# Patient Record
Sex: FEMALE | Race: WHITE | NOT HISPANIC OR LATINO
[De-identification: names, ages, dates, MRNs, and addresses within clinical notes are randomized per-mention and may not be internally consistent; named-entity substitution may affect disease eponyms.]

---

## 2017-11-07 ENCOUNTER — APPOINTMENT (OUTPATIENT)
Dept: OBGYN | Facility: CLINIC | Age: 62
End: 2017-11-07
Payer: COMMERCIAL

## 2017-11-07 VITALS
SYSTOLIC BLOOD PRESSURE: 120 MMHG | BODY MASS INDEX: 26.87 KG/M2 | WEIGHT: 146 LBS | HEIGHT: 62 IN | DIASTOLIC BLOOD PRESSURE: 82 MMHG

## 2017-11-07 PROCEDURE — 99396 PREV VISIT EST AGE 40-64: CPT

## 2017-11-08 ENCOUNTER — MESSAGE (OUTPATIENT)
Age: 62
End: 2017-11-08

## 2017-11-08 LAB — HPV HIGH+LOW RISK DNA PNL CVX: NOT DETECTED

## 2017-11-11 LAB — CYTOLOGY CVX/VAG DOC THIN PREP: NORMAL

## 2019-04-09 ENCOUNTER — APPOINTMENT (OUTPATIENT)
Dept: OBGYN | Facility: CLINIC | Age: 64
End: 2019-04-09
Payer: COMMERCIAL

## 2019-04-09 VITALS
WEIGHT: 145 LBS | SYSTOLIC BLOOD PRESSURE: 127 MMHG | HEIGHT: 62 IN | DIASTOLIC BLOOD PRESSURE: 82 MMHG | BODY MASS INDEX: 26.68 KG/M2

## 2019-04-09 PROCEDURE — 99396 PREV VISIT EST AGE 40-64: CPT

## 2019-04-09 PROCEDURE — 82270 OCCULT BLOOD FECES: CPT

## 2019-04-09 PROCEDURE — 81003 URINALYSIS AUTO W/O SCOPE: CPT | Mod: NC,QW

## 2019-04-09 NOTE — PHYSICAL EXAM
[Awake] : awake [Alert] : alert [Acute Distress] : no acute distress [Thyroid Nodule] : no thyroid nodule [Goiter] : no goiter [Mass] : no breast mass [Nipple Discharge] : no nipple discharge [Axillary LAD] : no axillary lymphadenopathy [Soft] : soft [Tender] : non tender [Oriented x3] : oriented to person, place, and time [Normal] : uterus [No Bleeding] : there was no active vaginal bleeding [Uterine Adnexae] : were not tender and not enlarged [No Tenderness] : no rectal tenderness [Occult Blood] : occult blood test from digital rectal exam was negative [External Hemorrhoid] : an external hemorrhoid [Nl Sphincter Tone] : normal sphincter tone [RRR, No Murmurs] : RRR, no murmurs [CTAB] : CTAB

## 2019-04-10 ENCOUNTER — MESSAGE (OUTPATIENT)
Age: 64
End: 2019-04-10

## 2019-04-10 LAB
APPEARANCE: CLEAR
BACTERIA: NEGATIVE
BILIRUB UR QL STRIP: NORMAL
BILIRUBIN URINE: NEGATIVE
BLOOD URINE: NEGATIVE
COLOR: COLORLESS
GLUCOSE QUALITATIVE U: NEGATIVE
GLUCOSE UR-MCNC: NORMAL
HCG UR QL: 0.2 EU/DL
HGB UR QL STRIP.AUTO: NORMAL
HYALINE CASTS: 2 /LPF
KETONES UR-MCNC: NORMAL
KETONES URINE: NEGATIVE
LEUKOCYTE ESTERASE UR QL STRIP: NORMAL
LEUKOCYTE ESTERASE URINE: NEGATIVE
MICROSCOPIC-UA: NORMAL
NITRITE UR QL STRIP: NORMAL
NITRITE URINE: NEGATIVE
PH UR STRIP: 6
PH URINE: 6.5
PROT UR STRIP-MCNC: NORMAL
PROTEIN URINE: NEGATIVE
RED BLOOD CELLS URINE: 2 /HPF
SP GR UR STRIP: 1.01
SPECIFIC GRAVITY URINE: 1.01
SQUAMOUS EPITHELIAL CELLS: 1 /HPF
UROBILINOGEN URINE: NORMAL
WHITE BLOOD CELLS URINE: 1 /HPF

## 2019-04-11 ENCOUNTER — MESSAGE (OUTPATIENT)
Age: 64
End: 2019-04-11

## 2019-04-11 LAB
BACTERIA UR CULT: NORMAL
HPV HIGH+LOW RISK DNA PNL CVX: NOT DETECTED

## 2019-04-13 ENCOUNTER — MESSAGE (OUTPATIENT)
Age: 64
End: 2019-04-13

## 2019-04-13 LAB — CYTOLOGY CVX/VAG DOC THIN PREP: NORMAL

## 2019-05-03 ENCOUNTER — RESULT REVIEW (OUTPATIENT)
Age: 64
End: 2019-05-03

## 2020-06-17 ENCOUNTER — APPOINTMENT (OUTPATIENT)
Dept: OBGYN | Facility: CLINIC | Age: 65
End: 2020-06-17
Payer: COMMERCIAL

## 2020-06-17 VITALS
HEIGHT: 62 IN | BODY MASS INDEX: 27.79 KG/M2 | WEIGHT: 151 LBS | SYSTOLIC BLOOD PRESSURE: 119 MMHG | DIASTOLIC BLOOD PRESSURE: 82 MMHG

## 2020-06-17 DIAGNOSIS — Z12.12 ENCOUNTER FOR SCREENING FOR MALIGNANT NEOPLASM OF COLON: ICD-10-CM

## 2020-06-17 DIAGNOSIS — Z01.419 ENCOUNTER FOR GYNECOLOGICAL EXAMINATION (GENERAL) (ROUTINE) W/OUT ABNORMAL FINDINGS: ICD-10-CM

## 2020-06-17 DIAGNOSIS — Z12.11 ENCOUNTER FOR SCREENING FOR MALIGNANT NEOPLASM OF COLON: ICD-10-CM

## 2020-06-17 PROCEDURE — 99396 PREV VISIT EST AGE 40-64: CPT

## 2020-06-17 PROCEDURE — 82270 OCCULT BLOOD FECES: CPT

## 2020-06-17 NOTE — PHYSICAL EXAM
[Acute Distress] : no acute distress [Awake] : awake [Alert] : alert [Nipple Discharge] : no nipple discharge [Mass] : no breast mass [Axillary LAD] : no axillary lymphadenopathy [Soft] : soft [Oriented x3] : oriented to person, place, and time [Tender] : non tender [Normal] : uterus [Uterine Adnexae] : were not tender and not enlarged [No Bleeding] : there was no active vaginal bleeding [No Tenderness] : no rectal tenderness [Occult Blood] : occult blood test from digital rectal exam was negative [Nl Sphincter Tone] : normal sphincter tone [External Hemorrhoid] : an external hemorrhoid

## 2020-06-18 ENCOUNTER — MESSAGE (OUTPATIENT)
Age: 65
End: 2020-06-18

## 2020-06-18 ENCOUNTER — NON-APPOINTMENT (OUTPATIENT)
Age: 65
End: 2020-06-18

## 2020-06-18 LAB
APPEARANCE: CLEAR
BACTERIA: NEGATIVE
BILIRUBIN URINE: NEGATIVE
BLOOD URINE: NORMAL
COLOR: YELLOW
GLUCOSE QUALITATIVE U: NEGATIVE
HYALINE CASTS: 2 /LPF
KETONES URINE: NEGATIVE
LEUKOCYTE ESTERASE URINE: ABNORMAL
MICROSCOPIC-UA: NORMAL
NITRITE URINE: NEGATIVE
PH URINE: 6
PROTEIN URINE: NORMAL
RED BLOOD CELLS URINE: 6 /HPF
SPECIFIC GRAVITY URINE: 1.03
SQUAMOUS EPITHELIAL CELLS: 3 /HPF
UROBILINOGEN URINE: NORMAL
WHITE BLOOD CELLS URINE: 3 /HPF

## 2020-06-19 ENCOUNTER — MESSAGE (OUTPATIENT)
Age: 65
End: 2020-06-19

## 2020-06-19 LAB
CYTOLOGY CVX/VAG DOC THIN PREP: NORMAL
HPV HIGH+LOW RISK DNA PNL CVX: NOT DETECTED

## 2020-06-21 LAB — BACTERIA UR CULT: ABNORMAL

## 2020-06-25 ENCOUNTER — APPOINTMENT (OUTPATIENT)
Dept: OBGYN | Facility: CLINIC | Age: 65
End: 2020-06-25

## 2022-04-18 ENCOUNTER — NON-APPOINTMENT (OUTPATIENT)
Age: 67
End: 2022-04-18

## 2022-04-18 ENCOUNTER — APPOINTMENT (OUTPATIENT)
Dept: OPHTHALMOLOGY | Facility: CLINIC | Age: 67
End: 2022-04-18
Payer: COMMERCIAL

## 2022-04-18 PROCEDURE — 92004 COMPRE OPH EXAM NEW PT 1/>: CPT

## 2022-06-09 ENCOUNTER — APPOINTMENT (OUTPATIENT)
Dept: OPHTHALMOLOGY | Facility: CLINIC | Age: 67
End: 2022-06-09
Payer: COMMERCIAL

## 2022-06-09 ENCOUNTER — NON-APPOINTMENT (OUTPATIENT)
Age: 67
End: 2022-06-09

## 2022-06-09 PROCEDURE — 92014 COMPRE OPH EXAM EST PT 1/>: CPT

## 2022-06-09 PROCEDURE — 92136 OPHTHALMIC BIOMETRY: CPT

## 2022-06-29 ENCOUNTER — APPOINTMENT (OUTPATIENT)
Dept: OPHTHALMOLOGY | Facility: HOSPITAL | Age: 67
End: 2022-06-29

## 2022-06-30 ENCOUNTER — APPOINTMENT (OUTPATIENT)
Dept: OPHTHALMOLOGY | Facility: CLINIC | Age: 67
End: 2022-06-30

## 2022-07-07 ENCOUNTER — APPOINTMENT (OUTPATIENT)
Dept: OPHTHALMOLOGY | Facility: CLINIC | Age: 67
End: 2022-07-07

## 2022-07-19 ENCOUNTER — OFFICE (OUTPATIENT)
Dept: URBAN - METROPOLITAN AREA CLINIC 12 | Facility: CLINIC | Age: 67
Setting detail: OPHTHALMOLOGY
End: 2022-07-19
Payer: COMMERCIAL

## 2022-07-19 DIAGNOSIS — H35.363: ICD-10-CM

## 2022-07-19 DIAGNOSIS — H43.813: ICD-10-CM

## 2022-07-19 DIAGNOSIS — H25.13: ICD-10-CM

## 2022-07-19 PROCEDURE — 92134 CPTRZ OPH DX IMG PST SGM RTA: CPT | Performed by: OPHTHALMOLOGY

## 2022-07-19 PROCEDURE — 92004 COMPRE OPH EXAM NEW PT 1/>: CPT | Performed by: OPHTHALMOLOGY

## 2022-07-19 ASSESSMENT — TONOMETRY
OS_IOP_MMHG: 21
OD_IOP_MMHG: 20

## 2022-07-19 ASSESSMENT — REFRACTION_CURRENTRX
OS_OVR_VA: 20/
OS_ADD: +2.50
OS_VPRISM_DIRECTION: SV
OD_ADD: +2.50
OD_VPRISM_DIRECTION: SV
OD_OVR_VA: 20/

## 2022-07-19 ASSESSMENT — SPHEQUIV_DERIVED
OS_SPHEQUIV: 0.625
OS_SPHEQUIV: 0.625

## 2022-07-19 ASSESSMENT — REFRACTION_AUTOREFRACTION
OD_AXIS: 000
OS_SPHERE: +0.75
OD_CYLINDER: SPH
OD_SPHERE: +0.75
OS_CYLINDER: -0.25
OS_AXIS: 053

## 2022-07-19 ASSESSMENT — KERATOMETRY
OD_K2POWER_DIOPTERS: 45.25
OD_K1POWER_DIOPTERS: 43.75
OD_AXISANGLE_DEGREES: 088
OS_K1POWER_DIOPTERS: 44.00
OS_K2POWER_DIOPTERS: 45.00
OS_AXISANGLE_DEGREES: 094

## 2022-07-19 ASSESSMENT — REFRACTION_MANIFEST
OD_AXIS: 000
OD_CYLINDER: SPH
OD_VA1: 20/25
OS_VA1: 20/25
OS_CYLINDER: -0.25
OS_SPHERE: +0.75
OS_AXIS: 055
OD_SPHERE: +0.75

## 2022-07-19 ASSESSMENT — VISUAL ACUITY
OS_BCVA: 20/40
OD_BCVA: 20/30

## 2022-07-19 ASSESSMENT — CONFRONTATIONAL VISUAL FIELD TEST (CVF)
OD_FINDINGS: FULL
OS_FINDINGS: FULL

## 2022-07-19 ASSESSMENT — AXIALLENGTH_DERIVED
OS_AL: 22.9965
OS_AL: 22.9965

## 2022-08-30 ENCOUNTER — OFFICE (OUTPATIENT)
Dept: URBAN - METROPOLITAN AREA CLINIC 12 | Facility: CLINIC | Age: 67
Setting detail: OPHTHALMOLOGY
End: 2022-08-30
Payer: COMMERCIAL

## 2022-08-30 DIAGNOSIS — H25.11: ICD-10-CM

## 2022-08-30 DIAGNOSIS — H25.13: ICD-10-CM

## 2022-08-30 PROCEDURE — 99214 OFFICE O/P EST MOD 30 MIN: CPT | Performed by: OPHTHALMOLOGY

## 2022-08-30 PROCEDURE — 92136 OPHTHALMIC BIOMETRY: CPT | Performed by: OPHTHALMOLOGY

## 2022-08-30 ASSESSMENT — REFRACTION_AUTOREFRACTION
OD_AXIS: 014
OS_CYLINDER: -0.50
OS_SPHERE: +1.00
OS_AXIS: 048
OD_CYLINDER: -0.25
OD_SPHERE: +0.75

## 2022-08-30 ASSESSMENT — REFRACTION_MANIFEST
OD_VA1: 20/25
OD_AXIS: 000
OD_CYLINDER: SPH
OS_CYLINDER: -0.25
OS_SPHERE: +0.75
OS_VA1: 20/25
OS_AXIS: 055
OD_SPHERE: +0.75

## 2022-08-30 ASSESSMENT — KERATOMETRY
OS_AXISANGLE_DEGREES: 105
OS_K1POWER_DIOPTERS: 44.00
OD_K1POWER_DIOPTERS: 43.75
OD_K2POWER_DIOPTERS: 45.25
OD_AXISANGLE_DEGREES: 089
OS_K2POWER_DIOPTERS: 45.00

## 2022-08-30 ASSESSMENT — SUPERFICIAL PUNCTATE KERATITIS (SPK)
OS_SPK: T
OD_SPK: T

## 2022-08-30 ASSESSMENT — SPHEQUIV_DERIVED
OS_SPHEQUIV: 0.75
OS_SPHEQUIV: 0.625
OD_SPHEQUIV: 0.625

## 2022-08-30 ASSESSMENT — AXIALLENGTH_DERIVED
OD_AL: 22.9965
OS_AL: 22.9965
OS_AL: 22.9503

## 2022-08-30 ASSESSMENT — CONFRONTATIONAL VISUAL FIELD TEST (CVF)
OS_FINDINGS: FULL
OD_FINDINGS: FULL

## 2022-08-30 ASSESSMENT — REFRACTION_CURRENTRX
OD_ADD: +2.50
OD_OVR_VA: 20/
OS_VPRISM_DIRECTION: SV
OD_VPRISM_DIRECTION: SV
OS_OVR_VA: 20/
OS_ADD: +2.50

## 2022-08-30 ASSESSMENT — TONOMETRY
OD_IOP_MMHG: 21
OS_IOP_MMHG: 21

## 2022-08-30 ASSESSMENT — VISUAL ACUITY
OD_BCVA: 20/40
OS_BCVA: 20/50

## 2022-09-09 ENCOUNTER — OFFICE (OUTPATIENT)
Dept: URBAN - METROPOLITAN AREA CLINIC 12 | Facility: CLINIC | Age: 67
Setting detail: OPHTHALMOLOGY
End: 2022-09-09
Payer: COMMERCIAL

## 2022-09-09 DIAGNOSIS — Z20.822: ICD-10-CM

## 2022-09-09 DIAGNOSIS — Z01.812: ICD-10-CM

## 2022-09-09 PROCEDURE — 99211 OFF/OP EST MAY X REQ PHY/QHP: CPT | Performed by: OPHTHALMOLOGY

## 2022-09-09 ASSESSMENT — REFRACTION_CURRENTRX
OS_VPRISM_DIRECTION: SV
OS_ADD: +2.50
OD_ADD: +2.50
OD_VPRISM_DIRECTION: SV
OD_OVR_VA: 20/
OS_OVR_VA: 20/

## 2022-09-09 ASSESSMENT — REFRACTION_MANIFEST
OS_SPHERE: +0.75
OS_VA1: 20/25
OD_SPHERE: +0.75
OD_CYLINDER: SPH
OD_VA1: 20/25
OS_AXIS: 055
OD_AXIS: 000
OS_CYLINDER: -0.25

## 2022-09-09 ASSESSMENT — KERATOMETRY
OS_K1POWER_DIOPTERS: 44.00
OS_AXISANGLE_DEGREES: 105
OD_K2POWER_DIOPTERS: 45.25
OD_AXISANGLE_DEGREES: 089
OD_K1POWER_DIOPTERS: 43.75
OS_K2POWER_DIOPTERS: 45.00

## 2022-09-09 ASSESSMENT — VISUAL ACUITY
OD_BCVA: 20/40
OS_BCVA: 20/50

## 2022-09-09 ASSESSMENT — SPHEQUIV_DERIVED
OS_SPHEQUIV: 0.75
OD_SPHEQUIV: 0.625
OS_SPHEQUIV: 0.625

## 2022-09-09 ASSESSMENT — REFRACTION_AUTOREFRACTION
OS_AXIS: 048
OD_AXIS: 014
OS_SPHERE: +1.00
OD_SPHERE: +0.75
OD_CYLINDER: -0.25
OS_CYLINDER: -0.50

## 2022-09-09 ASSESSMENT — AXIALLENGTH_DERIVED
OD_AL: 22.9965
OS_AL: 22.9965
OS_AL: 22.9503

## 2022-09-13 ENCOUNTER — AMBULATORY SURGERY CENTER (OUTPATIENT)
Dept: URBAN - METROPOLITAN AREA SURGERY 27 | Facility: SURGERY | Age: 67
Setting detail: OPHTHALMOLOGY
End: 2022-09-13
Payer: COMMERCIAL

## 2022-09-13 DIAGNOSIS — H25.11: ICD-10-CM

## 2022-09-13 PROCEDURE — 66984 XCAPSL CTRC RMVL W/O ECP: CPT | Performed by: OPHTHALMOLOGY

## 2022-09-14 ENCOUNTER — RX ONLY (RX ONLY)
Age: 67
End: 2022-09-14

## 2022-09-14 ENCOUNTER — OFFICE (OUTPATIENT)
Dept: URBAN - METROPOLITAN AREA CLINIC 12 | Facility: CLINIC | Age: 67
Setting detail: OPHTHALMOLOGY
End: 2022-09-14
Payer: COMMERCIAL

## 2022-09-14 DIAGNOSIS — Z96.1: ICD-10-CM

## 2022-09-14 PROCEDURE — 99024 POSTOP FOLLOW-UP VISIT: CPT | Performed by: OPTOMETRIST

## 2022-09-14 ASSESSMENT — SUPERFICIAL PUNCTATE KERATITIS (SPK)
OD_SPK: T
OS_SPK: T

## 2022-09-14 ASSESSMENT — REFRACTION_AUTOREFRACTION
OD_SPHERE: +1.00
OD_CYLINDER: -1.50
OD_AXIS: 176
OS_AXIS: 054
OS_SPHERE: +1.00
OS_CYLINDER: -0.25

## 2022-09-14 ASSESSMENT — REFRACTION_CURRENTRX
OS_VPRISM_DIRECTION: SV
OD_SPHERE: +2.50
OS_SPHERE: +2.50
OD_VPRISM_DIRECTION: SV
OD_OVR_VA: 20/
OS_OVR_VA: 20/

## 2022-09-14 ASSESSMENT — KERATOMETRY
OD_K2POWER_DIOPTERS: 45.00
OD_K1POWER_DIOPTERS: 43.25
OD_AXISANGLE_DEGREES: 090
OS_AXISANGLE_DEGREES: 103
OS_K2POWER_DIOPTERS: 44.75
OS_K1POWER_DIOPTERS: 43.75

## 2022-09-14 ASSESSMENT — CORNEAL EDEMA CLINICAL DESCRIPTION: OD_CORNEALEDEMA: T

## 2022-09-14 ASSESSMENT — REFRACTION_MANIFEST
OD_VA1: 20/25
OD_SPHERE: +0.75
OS_CYLINDER: -0.25
OD_CYLINDER: SPH
OD_AXIS: 000
OS_AXIS: 055
OS_VA1: 20/25
OS_SPHERE: +0.75

## 2022-09-14 ASSESSMENT — TONOMETRY
OS_IOP_MMHG: 16
OD_IOP_MMHG: 18

## 2022-09-14 ASSESSMENT — SPHEQUIV_DERIVED
OS_SPHEQUIV: 0.875
OS_SPHEQUIV: 0.625
OD_SPHEQUIV: 0.25

## 2022-09-14 ASSESSMENT — VISUAL ACUITY
OS_BCVA: 20/70
OD_BCVA: 20/40-2

## 2022-09-14 ASSESSMENT — AXIALLENGTH_DERIVED
OS_AL: 23.0841
OS_AL: 22.9912
OD_AL: 23.2694

## 2022-09-14 ASSESSMENT — CONFRONTATIONAL VISUAL FIELD TEST (CVF)
OD_FINDINGS: FULL
OS_FINDINGS: FULL

## 2022-09-20 ENCOUNTER — OFFICE (OUTPATIENT)
Dept: URBAN - METROPOLITAN AREA CLINIC 12 | Facility: CLINIC | Age: 67
Setting detail: OPHTHALMOLOGY
End: 2022-09-20
Payer: COMMERCIAL

## 2022-09-20 DIAGNOSIS — H25.12: ICD-10-CM

## 2022-09-20 PROCEDURE — 92136 OPHTHALMIC BIOMETRY: CPT | Performed by: OPHTHALMOLOGY

## 2022-09-20 ASSESSMENT — REFRACTION_MANIFEST
OS_VA1: 20/25
OS_SPHERE: +0.75
OS_AXIS: 055
OD_VA1: 20/25
OS_CYLINDER: -0.25
OD_AXIS: 000
OD_SPHERE: +0.75
OD_CYLINDER: SPH

## 2022-09-20 ASSESSMENT — SPHEQUIV_DERIVED
OS_SPHEQUIV: 0.625
OD_SPHEQUIV: 0
OS_SPHEQUIV: 0.875

## 2022-09-20 ASSESSMENT — CONFRONTATIONAL VISUAL FIELD TEST (CVF)
OS_FINDINGS: FULL
OD_FINDINGS: FULL

## 2022-09-20 ASSESSMENT — KERATOMETRY
OS_K1POWER_DIOPTERS: 44.00
OD_AXISANGLE_DEGREES: 099
OS_K2POWER_DIOPTERS: 45.00
OD_K2POWER_DIOPTERS: 45.00
OD_K1POWER_DIOPTERS: 43.50
OS_AXISANGLE_DEGREES: 098

## 2022-09-20 ASSESSMENT — REFRACTION_CURRENTRX
OD_VPRISM_DIRECTION: SV
OS_ADD: +2.50
OD_OVR_VA: 20/
OD_ADD: +2.50
OS_VPRISM_DIRECTION: SV
OS_OVR_VA: 20/

## 2022-09-20 ASSESSMENT — VISUAL ACUITY
OD_BCVA: 20/40+2
OS_BCVA: 20/40+2

## 2022-09-20 ASSESSMENT — REFRACTION_AUTOREFRACTION
OD_CYLINDER: -1.00
OD_AXIS: 176
OS_AXIS: 054
OS_CYLINDER: -0.25
OS_SPHERE: +1.00
OD_SPHERE: +0.50

## 2022-09-20 ASSESSMENT — AXIALLENGTH_DERIVED
OD_AL: 23.3196
OS_AL: 22.9965
OS_AL: 22.9043

## 2022-09-20 ASSESSMENT — TONOMETRY: OD_IOP_MMHG: 21

## 2022-09-20 ASSESSMENT — CORNEAL EDEMA CLINICAL DESCRIPTION: OD_CORNEALEDEMA: T

## 2022-09-20 ASSESSMENT — SUPERFICIAL PUNCTATE KERATITIS (SPK)
OS_SPK: T
OD_SPK: T

## 2022-09-23 ENCOUNTER — APPOINTMENT (OUTPATIENT)
Dept: ORTHOPEDIC SURGERY | Facility: CLINIC | Age: 67
End: 2022-09-23

## 2022-09-23 ENCOUNTER — OFFICE (OUTPATIENT)
Dept: URBAN - METROPOLITAN AREA CLINIC 12 | Facility: CLINIC | Age: 67
Setting detail: OPHTHALMOLOGY
End: 2022-09-23
Payer: COMMERCIAL

## 2022-09-23 DIAGNOSIS — Z01.812: ICD-10-CM

## 2022-09-23 DIAGNOSIS — Z20.822: ICD-10-CM

## 2022-09-23 PROCEDURE — 99211 OFF/OP EST MAY X REQ PHY/QHP: CPT | Performed by: OPHTHALMOLOGY

## 2022-09-23 ASSESSMENT — REFRACTION_AUTOREFRACTION
OD_AXIS: 176
OS_SPHERE: +1.00
OD_SPHERE: +0.50
OS_AXIS: 054
OD_CYLINDER: -1.00
OS_CYLINDER: -0.25

## 2022-09-23 ASSESSMENT — REFRACTION_MANIFEST
OD_VA1: 20/25
OS_SPHERE: +0.75
OD_CYLINDER: SPH
OS_VA1: 20/25
OS_CYLINDER: -0.25
OD_SPHERE: +0.75
OS_AXIS: 055
OD_AXIS: 000

## 2022-09-23 ASSESSMENT — KERATOMETRY
OD_AXISANGLE_DEGREES: 099
OS_AXISANGLE_DEGREES: 098
OD_K1POWER_DIOPTERS: 43.50
OD_K2POWER_DIOPTERS: 45.00
OS_K1POWER_DIOPTERS: 44.00
OS_K2POWER_DIOPTERS: 45.00

## 2022-09-23 ASSESSMENT — REFRACTION_CURRENTRX
OS_OVR_VA: 20/
OS_VPRISM_DIRECTION: SV
OS_ADD: +2.50
OD_ADD: +2.50
OD_VPRISM_DIRECTION: SV
OD_OVR_VA: 20/

## 2022-09-23 ASSESSMENT — VISUAL ACUITY
OD_BCVA: 20/40+2
OS_BCVA: 20/40+2

## 2022-09-23 ASSESSMENT — SPHEQUIV_DERIVED
OS_SPHEQUIV: 0.875
OS_SPHEQUIV: 0.625
OD_SPHEQUIV: 0

## 2022-09-23 ASSESSMENT — AXIALLENGTH_DERIVED
OD_AL: 23.3196
OS_AL: 22.9965
OS_AL: 22.9043

## 2022-09-27 ENCOUNTER — AMBULATORY SURGERY CENTER (OUTPATIENT)
Dept: URBAN - METROPOLITAN AREA SURGERY 27 | Facility: SURGERY | Age: 67
Setting detail: OPHTHALMOLOGY
End: 2022-09-27
Payer: COMMERCIAL

## 2022-09-27 DIAGNOSIS — H25.12: ICD-10-CM

## 2022-09-27 PROCEDURE — 66984 XCAPSL CTRC RMVL W/O ECP: CPT | Performed by: OPHTHALMOLOGY

## 2022-09-28 ENCOUNTER — RX ONLY (RX ONLY)
Age: 67
End: 2022-09-28

## 2022-09-28 ENCOUNTER — OFFICE (OUTPATIENT)
Dept: URBAN - METROPOLITAN AREA CLINIC 12 | Facility: CLINIC | Age: 67
Setting detail: OPHTHALMOLOGY
End: 2022-09-28
Payer: COMMERCIAL

## 2022-09-28 DIAGNOSIS — Z96.1: ICD-10-CM

## 2022-09-28 PROCEDURE — 99024 POSTOP FOLLOW-UP VISIT: CPT | Performed by: OPTOMETRIST

## 2022-09-28 ASSESSMENT — SPHEQUIV_DERIVED
OS_SPHEQUIV: 0.625
OD_SPHEQUIV: 0.25
OS_SPHEQUIV: 0.375

## 2022-09-28 ASSESSMENT — REFRACTION_CURRENTRX
OD_ADD: +2.50
OD_VPRISM_DIRECTION: SV
OD_OVR_VA: 20/
OS_ADD: +2.50
OS_OVR_VA: 20/
OS_VPRISM_DIRECTION: SV

## 2022-09-28 ASSESSMENT — KERATOMETRY
OD_K2POWER_DIOPTERS: 45.00
OD_AXISANGLE_DEGREES: 089
OS_K2POWER_DIOPTERS: 44.50
OD_K1POWER_DIOPTERS: 43.50
OS_AXISANGLE_DEGREES: 079
METHOD_AUTO_MANUAL: AUTO
OS_K1POWER_DIOPTERS: 43.50

## 2022-09-28 ASSESSMENT — REFRACTION_MANIFEST
OS_CYLINDER: -0.25
OS_SPHERE: +0.75
OS_VA1: 20/25
OD_SPHERE: +0.75
OD_VA1: 20/25
OD_AXIS: 000
OD_CYLINDER: SPH
OS_AXIS: 055

## 2022-09-28 ASSESSMENT — VISUAL ACUITY
OS_BCVA: 20/40
OD_BCVA: 20/40-2

## 2022-09-28 ASSESSMENT — TONOMETRY: OD_IOP_MMHG: 18

## 2022-09-28 ASSESSMENT — AXIALLENGTH_DERIVED
OS_AL: 23.1723
OS_AL: 23.2667
OD_AL: 23.2248

## 2022-09-28 ASSESSMENT — SUPERFICIAL PUNCTATE KERATITIS (SPK)
OD_SPK: T
OS_SPK: T

## 2022-09-28 ASSESSMENT — REFRACTION_AUTOREFRACTION
OS_AXIS: 178
OD_SPHERE: +0.75
OS_CYLINDER: -1.25
OD_AXIS: 176
OD_CYLINDER: -1.00
OS_SPHERE: +1.00

## 2022-09-28 ASSESSMENT — CONFRONTATIONAL VISUAL FIELD TEST (CVF)
OD_FINDINGS: FULL
OS_FINDINGS: FULL

## 2022-09-28 ASSESSMENT — CORNEAL EDEMA CLINICAL DESCRIPTION: OD_CORNEALEDEMA: ABSENT

## 2022-10-04 ENCOUNTER — OFFICE (OUTPATIENT)
Dept: URBAN - METROPOLITAN AREA CLINIC 12 | Facility: CLINIC | Age: 67
Setting detail: OPHTHALMOLOGY
End: 2022-10-04
Payer: COMMERCIAL

## 2022-10-04 DIAGNOSIS — H04.123: ICD-10-CM

## 2022-10-04 DIAGNOSIS — Z96.1: ICD-10-CM

## 2022-10-04 PROCEDURE — 99024 POSTOP FOLLOW-UP VISIT: CPT | Performed by: OPTOMETRIST

## 2022-10-04 ASSESSMENT — SPHEQUIV_DERIVED
OS_SPHEQUIV: 0.375
OD_SPHEQUIV: 0.25
OS_SPHEQUIV: 0.375
OD_SPHEQUIV: 0.25

## 2022-10-04 ASSESSMENT — KERATOMETRY
METHOD_AUTO_MANUAL: AUTO
OS_K2POWER_DIOPTERS: 44.50
OS_K1POWER_DIOPTERS: 43.75
OS_AXISANGLE_DEGREES: 087
OD_AXISANGLE_DEGREES: 098
OD_K1POWER_DIOPTERS: 43.50
OD_K2POWER_DIOPTERS: 45.25

## 2022-10-04 ASSESSMENT — REFRACTION_AUTOREFRACTION
OD_CYLINDER: -1.00
OS_CYLINDER: -0.75
OD_SPHERE: +0.75
OS_SPHERE: +0.75
OD_AXIS: 172
OS_AXIS: 019

## 2022-10-04 ASSESSMENT — TONOMETRY
OD_IOP_MMHG: 19
OS_IOP_MMHG: 19

## 2022-10-04 ASSESSMENT — REFRACTION_CURRENTRX
OS_ADD: +2.50
OD_VPRISM_DIRECTION: SV
OD_OVR_VA: 20/
OD_ADD: +2.50
OS_OVR_VA: 20/
OS_VPRISM_DIRECTION: SV

## 2022-10-04 ASSESSMENT — AXIALLENGTH_DERIVED
OS_AL: 23.2221
OD_AL: 23.1804
OD_AL: 23.1804
OS_AL: 23.2221

## 2022-10-04 ASSESSMENT — VISUAL ACUITY
OD_BCVA: 20/30+3
OS_BCVA: 20/50+2

## 2022-10-04 ASSESSMENT — REFRACTION_MANIFEST
OD_CYLINDER: -1.00
OD_AXIS: 172
OS_AXIS: 019
OD_SPHERE: +0.75
OS_CYLINDER: -0.75
OS_VA1: 20/25
OS_SPHERE: +0.75
OD_VA1: 20/25

## 2022-10-04 ASSESSMENT — TEAR BREAK UP TIME (TBUT)
OS_TBUT: T
OD_TBUT: T

## 2022-10-04 ASSESSMENT — CONFRONTATIONAL VISUAL FIELD TEST (CVF)
OD_FINDINGS: FULL
OS_FINDINGS: FULL

## 2022-10-04 ASSESSMENT — SUPERFICIAL PUNCTATE KERATITIS (SPK)
OS_SPK: 1+
OD_SPK: 1+

## 2022-11-29 ENCOUNTER — RX ONLY (RX ONLY)
Age: 67
End: 2022-11-29

## 2022-11-29 ENCOUNTER — OFFICE (OUTPATIENT)
Dept: URBAN - METROPOLITAN AREA CLINIC 12 | Facility: CLINIC | Age: 67
Setting detail: OPHTHALMOLOGY
End: 2022-11-29
Payer: COMMERCIAL

## 2022-11-29 DIAGNOSIS — Z96.1: ICD-10-CM

## 2022-11-29 PROBLEM — H35.363 DRUSEN; BOTH EYES: Status: ACTIVE | Noted: 2022-07-19

## 2022-11-29 PROBLEM — H52.7 REFRACTIVE ERROR: Status: ACTIVE | Noted: 2022-11-29

## 2022-11-29 PROBLEM — H43.813 POSTERIOR VITREOUS DETACHMENT; BOTH EYES: Status: ACTIVE | Noted: 2022-07-19

## 2022-11-29 PROBLEM — H04.123 DES- DRY EYES; BOTH EYES: Status: ACTIVE | Noted: 2022-08-30

## 2022-11-29 PROCEDURE — 99024 POSTOP FOLLOW-UP VISIT: CPT | Performed by: OPHTHALMOLOGY

## 2022-11-29 ASSESSMENT — KERATOMETRY
METHOD_AUTO_MANUAL: AUTO
OD_K1POWER_DIOPTERS: 43.75
OD_AXISANGLE_DEGREES: 093
OD_K2POWER_DIOPTERS: 45.25
OS_AXISANGLE_DEGREES: 095
OS_K1POWER_DIOPTERS: 44.25
OS_K2POWER_DIOPTERS: 45.25

## 2022-11-29 ASSESSMENT — REFRACTION_MANIFEST
OD_CYLINDER: -1.00
OS_AXIS: 019
OD_SPHERE: +0.75
OS_SPHERE: +0.75
OD_VA1: 20/25
OS_VA1: 20/25
OS_CYLINDER: -0.75
OD_AXIS: 172

## 2022-11-29 ASSESSMENT — VISUAL ACUITY
OD_BCVA: 20/30+
OS_BCVA: 20/40

## 2022-11-29 ASSESSMENT — REFRACTION_CURRENTRX
OD_ADD: +2.50
OS_VPRISM_DIRECTION: SV
OD_OVR_VA: 20/
OS_OVR_VA: 20/
OS_ADD: +2.50
OD_VPRISM_DIRECTION: SV

## 2022-11-29 ASSESSMENT — AXIALLENGTH_DERIVED
OD_AL: 23.1362
OD_AL: 23.325
OS_AL: 23.2356
OS_AL: 23.0018

## 2022-11-29 ASSESSMENT — TEAR BREAK UP TIME (TBUT)
OD_TBUT: T
OS_TBUT: T

## 2022-11-29 ASSESSMENT — TONOMETRY
OD_IOP_MMHG: 18
OS_IOP_MMHG: 20

## 2022-11-29 ASSESSMENT — SPHEQUIV_DERIVED
OD_SPHEQUIV: 0.25
OS_SPHEQUIV: 0.375
OD_SPHEQUIV: -0.25
OS_SPHEQUIV: -0.25

## 2022-11-29 ASSESSMENT — REFRACTION_AUTOREFRACTION
OS_AXIS: 016
OD_CYLINDER: -1.00
OS_SPHERE: +0.25
OS_CYLINDER: -1.00
OD_AXIS: 174
OD_SPHERE: +0.25

## 2022-11-29 ASSESSMENT — SUPERFICIAL PUNCTATE KERATITIS (SPK)
OD_SPK: 1+ 2+
OS_SPK: 1+ 2+

## 2022-11-29 ASSESSMENT — CONFRONTATIONAL VISUAL FIELD TEST (CVF)
OD_FINDINGS: FULL
OS_FINDINGS: FULL

## 2022-12-05 ENCOUNTER — APPOINTMENT (OUTPATIENT)
Dept: PAIN MANAGEMENT | Facility: CLINIC | Age: 67
End: 2022-12-05

## 2022-12-19 ENCOUNTER — APPOINTMENT (OUTPATIENT)
Dept: PAIN MANAGEMENT | Facility: CLINIC | Age: 67
End: 2022-12-19

## 2022-12-19 DIAGNOSIS — M53.3 SACROCOCCYGEAL DISORDERS, NOT ELSEWHERE CLASSIFIED: ICD-10-CM

## 2022-12-19 PROCEDURE — 99204 OFFICE O/P NEW MOD 45 MIN: CPT

## 2022-12-19 RX ORDER — AMOXICILLIN 500 MG/1
500 TABLET, FILM COATED ORAL
Qty: 20 | Refills: 0 | Status: DISCONTINUED | COMMUNITY
Start: 2022-07-05

## 2022-12-19 RX ORDER — PANTOPRAZOLE 40 MG/1
40 TABLET, DELAYED RELEASE ORAL
Qty: 90 | Refills: 0 | Status: ACTIVE | COMMUNITY
Start: 2022-06-20

## 2022-12-19 NOTE — ASSESSMENT
[FreeTextEntry1] : This is DAPHNE WANG,  a 67 year-old female here for tailbone pain with impairment in ADLs and functionality.  The pain has not responded to conservative care including NSAID therapy and/or physical therapy.  There is no bleeding tendency, unstable medical condition, or systemic infection.\par \par Based on history and physical, I suspect there are likely multiple pain generators, including coccydynia. \par \par I discussed the above at length with the patient, including prognosis, complications, and red flag symptoms.  We discussed a graded and multidisciplinary treatment plan, including physical therapy/HEP, medications, and/or interventional procedures.  The risks and benefits of each of these were addressed and all questions answered to the patient's apparent satisfaction.  After this discussion, the following plan was developed with the patient: \par \par 1. PT: Emphasized importance of PT/HEP as a mainstay of treatment. Pelvic floor rehab rx provided.\par 2.  Medication(s): continue PRN meds; pt feels pain is gradually improving so will defer daily med at this time\par 3.  Imaging/Labs: reviewed as above\par 4.  Procedure(s): Recommend GOI with fluoroscopic guidance.\par 5.  Follow-Up: for procedure, then 2 weeks after. \par \par \par The risks, benefits and alternatives of the proposed procedure were explained in detail with the patient. The risks outlined include but are not limited to infection, bleeding, post- dural puncture headache (for DAT), nerve injury, a temporary increase in pain, failure to resolve symptoms, allergic reaction, and possible elevation of blood sugar in diabetics if using corticosteroid.  All questions were answered to patient's apparent satisfaction and he/she verbalized an understanding.\par \par Medications have been discussed and reconciled, adverse reactions and side effects have been reviewed with patient.  When appropriate, iSTOP/ has been checked and any aberrations discussed with patient.  \par \par

## 2022-12-19 NOTE — HISTORY OF PRESENT ILLNESS
[FreeTextEntry1] : Initial HPI: \par \par 12/19/2022: This is DAPHNE WANG, a pleasant 67 year-old female presenting for initial evaluation of tailbone pain\par \par Location of maximal pain: tailbone\par Onset: 2 months ago; no trauma or inciting event\par Provoking factors: sitting\par Palliating factors: standing, heating pad\par Radiation pattern: none\par Associated symptoms: denies numbness, tingling, or weakness \par Patient denies bowel/bladder incontinence, saddle anesthesia, fevers, chills, weight loss, or recent falls.  \par \par Current Pain Medications:\par Motrin - helpful\par \par Past Pain Medications:\par None\par \par Prior Procedures/Treatments: \par None\par \par \par History of back, neck, gynecologic, or pelvic surgery: \par None\par \par Blood thinners:\par None\par \par \par Physical Therapy:\par None\par \par \par --------------------------------------------------------\par Imaging Review:\par \par \par 11/22/2022 CT-ABDOMEN AND PELVIS PRE AND POST IV CONTRAST ([facility])\par \par Comparison is made with pre/post contrast enhanced CT of abdomen and pelvis from\par 5/17/2016.\par \par Kidneys: Right kidney shows small 3 mm nonobstructive stone along lower pole\par sinus. Both kidneys demonstrate symmetrical enhancement and excretion. There is\par no suspicious enhancing renal mass. There is no hydronephrosis.\par \par Pelvis: Bladder shows normal distention. Uterus shows small anterior body myoma.\par Rest of GYN structures are grossly unremarkable. There is no suspicious pelvic\par adenopathy.\par \par Retroperitoneum: Scattered calcified plaques are seen of abdominal aorta.\par Abdominal aorta is not aneurysmal. There is no suspicious adenopathy.\par \par Liver: There is no hepatomegaly. There is no suspicious mass.\par \par Gallbladder/biliary tree: Gallbladder is not pathologically distended. There is\par no biliary dilatation.\par \par Spleen: There is no splenomegaly. There is no suspicious mass.\par \par Pancreas: There is no pancreatitis. There is no pancreatic ductal dilatation.\par There is no suspicious mass.\par \par Adrenals: There is no suspicious adrenal mass.\par \par GI: There is small hiatal hernia. There is unusual gastric fold thickening,\par irregularity and differential density just below hiatus. Gastric wall pathology\par cannot be excluded. Correlate with upper GI symptom. This area can be clarified\par with upper endoscopy as clinically indicated.\par \par Rest of small bowel is unremarkable. There is diverticulosis without\par diverticulitis. There is nonspecific, nonobstructive bowel gas pattern without\par suspicious inflammatory process.\par \par Lung bases: There is no suspicious lung infiltrate in the visualized lung\par parenchyma.\par \par Bony structures, abdominal wall, soft tissue: No suspicious new finding.\par \par IMPRESSION:\par Small 3 mm nonobstructive stone in right kidney.\par Otherwise, no suspicious mass in collecting system.\par Additional gastric finding near GE junction as described.\par \par By my eye, exam also notable for acute angulation of coccyx\par \par All pertinent imaging independently reviewed and interpreted by me.  \par \par  [FreeTextEntry2] : n

## 2023-01-05 ENCOUNTER — OFFICE (OUTPATIENT)
Dept: URBAN - METROPOLITAN AREA CLINIC 12 | Facility: CLINIC | Age: 68
Setting detail: OPHTHALMOLOGY
End: 2023-01-05
Payer: MEDICARE

## 2023-01-05 DIAGNOSIS — H04.123: ICD-10-CM

## 2023-01-05 DIAGNOSIS — H43.813: ICD-10-CM

## 2023-01-05 DIAGNOSIS — H35.363: ICD-10-CM

## 2023-01-05 PROCEDURE — 92134 CPTRZ OPH DX IMG PST SGM RTA: CPT | Performed by: OPHTHALMOLOGY

## 2023-01-05 PROCEDURE — 92014 COMPRE OPH EXAM EST PT 1/>: CPT | Performed by: OPHTHALMOLOGY

## 2023-01-05 ASSESSMENT — AXIALLENGTH_DERIVED
OS_AL: 23.0894
OD_AL: 23.0921
OS_AL: 22.2359
OS_AL: 23.1831
OD_AL: 23.3278
OD_AL: 22.2384

## 2023-01-05 ASSESSMENT — REFRACTION_AUTOREFRACTION
OD_CYLINDER: -1.25
OD_AXIS: 177
OS_AXIS: 022
OD_SPHERE: +0.25
OS_SPHERE: +0.50
OS_CYLINDER: -0.75

## 2023-01-05 ASSESSMENT — TONOMETRY
OS_IOP_MMHG: 19
OD_IOP_MMHG: 18

## 2023-01-05 ASSESSMENT — REFRACTION_MANIFEST
OD_CYLINDER: -0.75
OS_CYLINDER: -0.50
OS_AXIS: 010
OS_AXIS: 019
OD_VA1: 20/25
OS_SPHERE: +3.00
OD_CYLINDER: -1.00
OD_SPHERE: +0.75
OS_CYLINDER: -0.75
OS_VA1: 20/25
OD_SPHERE: +3.00
OS_SPHERE: +0.75
OD_AXIS: 172
OD_AXIS: 180

## 2023-01-05 ASSESSMENT — SPHEQUIV_DERIVED
OD_SPHEQUIV: -0.375
OD_SPHEQUIV: 0.25
OS_SPHEQUIV: 0.375
OD_SPHEQUIV: 2.625
OS_SPHEQUIV: 0.125
OS_SPHEQUIV: 2.75

## 2023-01-05 ASSESSMENT — SUPERFICIAL PUNCTATE KERATITIS (SPK)
OD_SPK: 1+ 2+
OS_SPK: 1+ 2+

## 2023-01-05 ASSESSMENT — REFRACTION_CURRENTRX
OS_VPRISM_DIRECTION: SV
OD_ADD: +2.50
OD_VPRISM_DIRECTION: SV
OS_ADD: +2.50
OD_OVR_VA: 20/
OS_OVR_VA: 20/

## 2023-01-05 ASSESSMENT — KERATOMETRY
METHOD_AUTO_MANUAL: AUTO
OD_K2POWER_DIOPTERS: 45.50
OS_K1POWER_DIOPTERS: 44.00
OS_AXISANGLE_DEGREES: 096
OD_AXISANGLE_DEGREES: 095
OS_K2POWER_DIOPTERS: 45.00
OD_K1POWER_DIOPTERS: 43.75

## 2023-01-05 ASSESSMENT — CONFRONTATIONAL VISUAL FIELD TEST (CVF)
OS_FINDINGS: FULL
OD_FINDINGS: FULL

## 2023-01-05 ASSESSMENT — TEAR BREAK UP TIME (TBUT)
OD_TBUT: T
OS_TBUT: T

## 2023-01-05 ASSESSMENT — VISUAL ACUITY
OS_BCVA: 20/30+2
OD_BCVA: 20/30+2

## 2023-06-30 ENCOUNTER — APPOINTMENT (OUTPATIENT)
Dept: OPHTHALMOLOGY | Facility: CLINIC | Age: 68
End: 2023-06-30
Payer: MEDICARE

## 2023-06-30 ENCOUNTER — NON-APPOINTMENT (OUTPATIENT)
Age: 68
End: 2023-06-30

## 2023-06-30 PROCEDURE — 92014 COMPRE OPH EXAM EST PT 1/>: CPT

## 2023-09-23 ENCOUNTER — APPOINTMENT (OUTPATIENT)
Dept: ORTHOPEDIC SURGERY | Facility: CLINIC | Age: 68
End: 2023-09-23
Payer: MEDICARE

## 2023-09-23 VITALS
HEART RATE: 72 BPM | DIASTOLIC BLOOD PRESSURE: 85 MMHG | BODY MASS INDEX: 26.87 KG/M2 | SYSTOLIC BLOOD PRESSURE: 145 MMHG | WEIGHT: 146 LBS | HEIGHT: 62 IN

## 2023-09-23 DIAGNOSIS — M25.562 PAIN IN RIGHT KNEE: ICD-10-CM

## 2023-09-23 DIAGNOSIS — M17.12 UNILATERAL PRIMARY OSTEOARTHRITIS, LEFT KNEE: ICD-10-CM

## 2023-09-23 DIAGNOSIS — M17.11 UNILATERAL PRIMARY OSTEOARTHRITIS, RIGHT KNEE: ICD-10-CM

## 2023-09-23 DIAGNOSIS — M25.561 PAIN IN RIGHT KNEE: ICD-10-CM

## 2023-09-23 PROCEDURE — 99203 OFFICE O/P NEW LOW 30 MIN: CPT

## 2023-09-23 PROCEDURE — 73562 X-RAY EXAM OF KNEE 3: CPT | Mod: 50

## 2023-10-03 ENCOUNTER — OUTPATIENT (OUTPATIENT)
Dept: OUTPATIENT SERVICES | Facility: HOSPITAL | Age: 68
LOS: 1 days | End: 2023-10-03
Payer: MEDICARE

## 2023-10-03 VITALS
SYSTOLIC BLOOD PRESSURE: 148 MMHG | OXYGEN SATURATION: 98 % | HEART RATE: 73 BPM | DIASTOLIC BLOOD PRESSURE: 84 MMHG | RESPIRATION RATE: 14 BRPM | HEIGHT: 62 IN | TEMPERATURE: 98 F | WEIGHT: 149.91 LBS

## 2023-10-03 DIAGNOSIS — Z98.42 CATARACT EXTRACTION STATUS, LEFT EYE: Chronic | ICD-10-CM

## 2023-10-03 DIAGNOSIS — Z98.41 CATARACT EXTRACTION STATUS, RIGHT EYE: Chronic | ICD-10-CM

## 2023-10-03 DIAGNOSIS — Z98.890 OTHER SPECIFIED POSTPROCEDURAL STATES: Chronic | ICD-10-CM

## 2023-10-03 DIAGNOSIS — M17.11 UNILATERAL PRIMARY OSTEOARTHRITIS, RIGHT KNEE: ICD-10-CM

## 2023-10-03 DIAGNOSIS — Z01.818 ENCOUNTER FOR OTHER PREPROCEDURAL EXAMINATION: ICD-10-CM

## 2023-10-03 DIAGNOSIS — Z90.49 ACQUIRED ABSENCE OF OTHER SPECIFIED PARTS OF DIGESTIVE TRACT: Chronic | ICD-10-CM

## 2023-10-03 LAB
A1C WITH ESTIMATED AVERAGE GLUCOSE RESULT: 5.4 % — SIGNIFICANT CHANGE UP (ref 4–5.6)
ALBUMIN SERPL ELPH-MCNC: 4.3 G/DL — SIGNIFICANT CHANGE UP (ref 3.3–5)
ALP SERPL-CCNC: 93 U/L — SIGNIFICANT CHANGE UP (ref 30–120)
ALT FLD-CCNC: 30 U/L — SIGNIFICANT CHANGE UP (ref 10–60)
ANION GAP SERPL CALC-SCNC: 8 MMOL/L — SIGNIFICANT CHANGE UP (ref 5–17)
APTT BLD: 35.5 SEC — SIGNIFICANT CHANGE UP (ref 24.5–35.6)
AST SERPL-CCNC: 25 U/L — SIGNIFICANT CHANGE UP (ref 10–40)
BILIRUB SERPL-MCNC: 0.5 MG/DL — SIGNIFICANT CHANGE UP (ref 0.2–1.2)
BLD GP AB SCN SERPL QL: SIGNIFICANT CHANGE UP
BUN SERPL-MCNC: 17 MG/DL — SIGNIFICANT CHANGE UP (ref 7–23)
CALCIUM SERPL-MCNC: 9.7 MG/DL — SIGNIFICANT CHANGE UP (ref 8.4–10.5)
CHLORIDE SERPL-SCNC: 102 MMOL/L — SIGNIFICANT CHANGE UP (ref 96–108)
CO2 SERPL-SCNC: 30 MMOL/L — SIGNIFICANT CHANGE UP (ref 22–31)
CREAT SERPL-MCNC: 0.65 MG/DL — SIGNIFICANT CHANGE UP (ref 0.5–1.3)
EGFR: 98 ML/MIN/1.73M2 — SIGNIFICANT CHANGE UP
ESTIMATED AVERAGE GLUCOSE: 108 MG/DL — SIGNIFICANT CHANGE UP (ref 68–114)
GLUCOSE SERPL-MCNC: 93 MG/DL — SIGNIFICANT CHANGE UP (ref 70–99)
HCT VFR BLD CALC: 39.6 % — SIGNIFICANT CHANGE UP (ref 34.5–45)
HGB BLD-MCNC: 12.8 G/DL — SIGNIFICANT CHANGE UP (ref 11.5–15.5)
INR BLD: 1.03 RATIO — SIGNIFICANT CHANGE UP (ref 0.85–1.18)
MCHC RBC-ENTMCNC: 28.4 PG — SIGNIFICANT CHANGE UP (ref 27–34)
MCHC RBC-ENTMCNC: 32.3 GM/DL — SIGNIFICANT CHANGE UP (ref 32–36)
MCV RBC AUTO: 88 FL — SIGNIFICANT CHANGE UP (ref 80–100)
MRSA PCR RESULT.: SIGNIFICANT CHANGE UP
NRBC # BLD: 0 /100 WBCS — SIGNIFICANT CHANGE UP (ref 0–0)
PLATELET # BLD AUTO: 255 K/UL — SIGNIFICANT CHANGE UP (ref 150–400)
POTASSIUM SERPL-MCNC: 4 MMOL/L — SIGNIFICANT CHANGE UP (ref 3.5–5.3)
POTASSIUM SERPL-SCNC: 4 MMOL/L — SIGNIFICANT CHANGE UP (ref 3.5–5.3)
PROT SERPL-MCNC: 7.6 G/DL — SIGNIFICANT CHANGE UP (ref 6–8.3)
PROTHROM AB SERPL-ACNC: 11.2 SEC — SIGNIFICANT CHANGE UP (ref 9.5–13)
RBC # BLD: 4.5 M/UL — SIGNIFICANT CHANGE UP (ref 3.8–5.2)
RBC # FLD: 13 % — SIGNIFICANT CHANGE UP (ref 10.3–14.5)
S AUREUS DNA NOSE QL NAA+PROBE: SIGNIFICANT CHANGE UP
SODIUM SERPL-SCNC: 140 MMOL/L — SIGNIFICANT CHANGE UP (ref 135–145)
WBC # BLD: 5.41 K/UL — SIGNIFICANT CHANGE UP (ref 3.8–10.5)
WBC # FLD AUTO: 5.41 K/UL — SIGNIFICANT CHANGE UP (ref 3.8–10.5)

## 2023-10-03 PROCEDURE — 85027 COMPLETE CBC AUTOMATED: CPT

## 2023-10-03 PROCEDURE — G0463: CPT

## 2023-10-03 PROCEDURE — 86900 BLOOD TYPING SEROLOGIC ABO: CPT

## 2023-10-03 PROCEDURE — 87640 STAPH A DNA AMP PROBE: CPT

## 2023-10-03 PROCEDURE — 86850 RBC ANTIBODY SCREEN: CPT

## 2023-10-03 PROCEDURE — 93005 ELECTROCARDIOGRAM TRACING: CPT

## 2023-10-03 PROCEDURE — 87641 MR-STAPH DNA AMP PROBE: CPT

## 2023-10-03 PROCEDURE — 85730 THROMBOPLASTIN TIME PARTIAL: CPT

## 2023-10-03 PROCEDURE — 36415 COLL VENOUS BLD VENIPUNCTURE: CPT

## 2023-10-03 PROCEDURE — 85610 PROTHROMBIN TIME: CPT

## 2023-10-03 PROCEDURE — 86901 BLOOD TYPING SEROLOGIC RH(D): CPT

## 2023-10-03 PROCEDURE — 80053 COMPREHEN METABOLIC PANEL: CPT

## 2023-10-03 PROCEDURE — 83036 HEMOGLOBIN GLYCOSYLATED A1C: CPT

## 2023-10-03 PROCEDURE — 93010 ELECTROCARDIOGRAM REPORT: CPT

## 2023-10-03 NOTE — H&P PST ADULT - HISTORY OF PRESENT ILLNESS
68 yo female presents s/p right knee arthroscopy 15 years ago now with 3 year history of worsening right knee pain. She was treated in June with 1 cortisone injection with no relief and 3 "gel shots" and states that pain worsened after that. Also reports intermittent locking and clicking and now ambulates with a cane. No relief in the past with meloxicam. Good relief with motrin. Pain now 0-2/10 at rest and increased to 8-10/10 with activity.

## 2023-10-03 NOTE — H&P PST ADULT - NSANTHOSAYNRD_GEN_A_CORE
neck 14.5 inches/No. ZULEIMA screening performed.  STOP BANG Legend: 0-2 = LOW Risk; 3-4 = INTERMEDIATE Risk; 5-8 = HIGH Risk

## 2023-10-03 NOTE — H&P PST ADULT - NSICDXPASTMEDICALHX_GEN_ALL_CORE_FT
PAST MEDICAL HISTORY:  Benign colon polyp     COVID-19 vaccine series completed     Environmental allergies     History of gastroesophageal reflux (GERD)     History of kidney stones     History of migraine     History of varicose veins     Latex allergy     Microscopic hematuria     Osteoarthritis     Primary osteoarthritis of right knee

## 2023-10-03 NOTE — H&P PST ADULT - PROBLEM SELECTOR PLAN 1
right TKR. medical clearance requested. instructed to stop motrin 1 week prior to surgery. gatorade and surgical wash instructions reviewed and verbalized understanding.

## 2023-10-03 NOTE — H&P PST ADULT - NSICDXPASTSURGICALHX_GEN_ALL_CORE_FT
PAST SURGICAL HISTORY:  History of arthroscopy of left knee     History of arthroscopy of right knee     History of left breast biopsy     History of left cataract extraction     History of lithotripsy     History of lumpectomy of right breast     History of parotidectomy     History of right breast biopsy     History of right cataract extraction     History of varicose vein ligation and stripping

## 2023-10-03 NOTE — H&P PST ADULT - NSICDXFAMILYHX_GEN_ALL_CORE_FT
FAMILY HISTORY:  Father  Still living? No  Family history of colon cancer, Age at diagnosis: Age Unknown  Family history of lung cancer, Age at diagnosis: Age Unknown  FHx: coronary artery disease, Age at diagnosis: Age Unknown    Mother  Still living? No  Family history of pulmonary embolism, Age at diagnosis: Age Unknown    Sibling  Still living? Yes, Estimated age: 61-70  Family history of leukemia, Age at diagnosis: Age Unknown

## 2023-10-03 NOTE — H&P PST ADULT - MUSCULOSKELETAL
right knee/no joint erythema/no joint warmth/no calf tenderness/decreased ROM/decreased ROM due to pain/strength 5/5 bilateral upper extremities/decreased strength/abnormal gait details…

## 2023-10-03 NOTE — H&P PST ADULT - ENDOCRINE
[de-identified] : Patient ambulates with a normal gait.\par Negative straight leg raise bilateral\par \par General: in no acute distress, seated comfortably, moving easily\par Skin: No discoloration, rashes; on palpation skin is dry, \par Neuro: Normal sensation all dermatomes, motor all myotomes\par Vascular: Normal pulses, no edema, normal temperature\par Coordination and balance: Normal\par Psych: normal mood and affect, non pressured speech, alert and oriented x3\par \par Bilateral Knees:                         	\par ROM:  0-145 degrees\par \par Lachman: Negative\par Pivot Shift: Negative\par Anterior Drawer: Negative\par Posterior Drawer / Sag: Negative\par Varus Stress 0 degrees: Stable\par Varus Stress 30 degrees: Stable\par Valgus Stress 0 degrees: Stable\par Valgus Stress 30 degrees: Stable\par Medial Mohan: Negative\par Lateral Mohan: Negative\par Patella Glide: 2+\par Patella Apprehension: Negative\par Patella Grind: Negative\par \par Palpation:\par Medial Joint Line: Nontender\par Lateral Joint Line: Nontender\par Medial Collateral Ligament: Nontender\par Lateral Collateral Ligament/PLC: Nontender\par Distal Femur: Nontender\par Proximal Tibia: Nontender\par Tibial Tubercle: Nontender\par Distal Pole Patella: Nontender\par Quadriceps Tendon: Nontender &  Intact\par Patella Tendon: Nontender &  Intact\par Medial Distal Hamstring/PES: Nontender\par Lateral Distal Hamstring: Nontender & Stable\par Iliotibial Band: Nontender\par Medial Patellofemoral Ligament: Nontender\par Adductor: Nontender\par Proximal GSC-Plantaris: Nontender\par Calf: Supple & Nontender\par Patellofemoral compression: TTP\par \par Inspection:\par Deformity: No\par Erythema: No\par Ecchymosis: No\par Abrasions: No\par Effusion: No\par Prepatellar Bursitis: No\par \par Neurologic Exam:\par Sensation L4-S1: Grossly Intact\par \par Motor Exam:\par Quadriceps: 5 out of 5\par Hamstrings: 5 out of 5\par EHL: 5 out of 5\par FHL: 5 out of 5\par TA: 5 out of 5\par GS: 5 out of 5\par \par Circulatory/Pulses:\par Dorsalis Pedis: 2+\par Posterior Tibialis: 2+ [Bilateral] : knee bilaterally [There are no fractures, subluxations or dislocations. No significant abnormalities are seen] : There are no fractures, subluxations or dislocations. No significant abnormalities are seen No negative

## 2023-10-11 PROBLEM — K63.5 POLYP OF COLON: Chronic | Status: ACTIVE | Noted: 2023-10-03

## 2023-10-11 PROBLEM — Z91.09 OTHER ALLERGY STATUS, OTHER THAN TO DRUGS AND BIOLOGICAL SUBSTANCES: Chronic | Status: ACTIVE | Noted: 2023-10-03

## 2023-10-11 PROBLEM — Z91.040 LATEX ALLERGY STATUS: Chronic | Status: ACTIVE | Noted: 2023-10-03

## 2023-10-11 PROBLEM — R31.29 OTHER MICROSCOPIC HEMATURIA: Chronic | Status: ACTIVE | Noted: 2023-10-03

## 2023-10-11 PROBLEM — Z87.19 PERSONAL HISTORY OF OTHER DISEASES OF THE DIGESTIVE SYSTEM: Chronic | Status: ACTIVE | Noted: 2023-10-03

## 2023-10-11 PROBLEM — M17.11 UNILATERAL PRIMARY OSTEOARTHRITIS, RIGHT KNEE: Chronic | Status: ACTIVE | Noted: 2023-10-03

## 2023-10-11 PROBLEM — M19.90 UNSPECIFIED OSTEOARTHRITIS, UNSPECIFIED SITE: Chronic | Status: ACTIVE | Noted: 2023-10-03

## 2023-10-11 PROBLEM — Z86.69 PERSONAL HISTORY OF OTHER DISEASES OF THE NERVOUS SYSTEM AND SENSE ORGANS: Chronic | Status: ACTIVE | Noted: 2023-10-03

## 2023-10-11 PROBLEM — Z87.442 PERSONAL HISTORY OF URINARY CALCULI: Chronic | Status: ACTIVE | Noted: 2023-10-03

## 2023-10-11 PROBLEM — Z86.79 PERSONAL HISTORY OF OTHER DISEASES OF THE CIRCULATORY SYSTEM: Chronic | Status: ACTIVE | Noted: 2023-10-03

## 2023-10-11 PROBLEM — Z92.29 PERSONAL HISTORY OF OTHER DRUG THERAPY: Chronic | Status: ACTIVE | Noted: 2023-10-03

## 2023-10-16 ENCOUNTER — APPOINTMENT (OUTPATIENT)
Dept: ORTHOPEDIC SURGERY | Facility: HOSPITAL | Age: 68
End: 2023-10-16

## 2023-10-16 ENCOUNTER — INPATIENT (INPATIENT)
Facility: HOSPITAL | Age: 68
LOS: 0 days | Discharge: HOME | DRG: 470 | End: 2023-10-17
Attending: ORTHOPAEDIC SURGERY | Admitting: ORTHOPAEDIC SURGERY
Payer: COMMERCIAL

## 2023-10-16 ENCOUNTER — TRANSCRIPTION ENCOUNTER (OUTPATIENT)
Age: 68
End: 2023-10-16

## 2023-10-16 VITALS
OXYGEN SATURATION: 99 % | SYSTOLIC BLOOD PRESSURE: 134 MMHG | DIASTOLIC BLOOD PRESSURE: 73 MMHG | TEMPERATURE: 98 F | HEIGHT: 62 IN | WEIGHT: 144.84 LBS | HEART RATE: 78 BPM | RESPIRATION RATE: 18 BRPM

## 2023-10-16 DIAGNOSIS — Z98.890 OTHER SPECIFIED POSTPROCEDURAL STATES: Chronic | ICD-10-CM

## 2023-10-16 DIAGNOSIS — Z98.42 CATARACT EXTRACTION STATUS, LEFT EYE: Chronic | ICD-10-CM

## 2023-10-16 DIAGNOSIS — M17.11 UNILATERAL PRIMARY OSTEOARTHRITIS, RIGHT KNEE: ICD-10-CM

## 2023-10-16 DIAGNOSIS — Z90.49 ACQUIRED ABSENCE OF OTHER SPECIFIED PARTS OF DIGESTIVE TRACT: Chronic | ICD-10-CM

## 2023-10-16 DIAGNOSIS — Z98.41 CATARACT EXTRACTION STATUS, RIGHT EYE: Chronic | ICD-10-CM

## 2023-10-16 LAB — ABO RH CONFIRMATION: SIGNIFICANT CHANGE UP

## 2023-10-16 PROCEDURE — 27447 TOTAL KNEE ARTHROPLASTY: CPT | Mod: RT

## 2023-10-16 PROCEDURE — 73560 X-RAY EXAM OF KNEE 1 OR 2: CPT | Mod: 26,RT

## 2023-10-16 DEVICE — IMPLANTABLE DEVICE: Type: IMPLANTABLE DEVICE | Status: FUNCTIONAL

## 2023-10-16 DEVICE — PIN CAS FIX 3.2X150MM: Type: IMPLANTABLE DEVICE | Status: FUNCTIONAL

## 2023-10-16 DEVICE — SCREW HEX HEADED 3.5X48MM: Type: IMPLANTABLE DEVICE | Status: FUNCTIONAL

## 2023-10-16 DEVICE — ZIMMER/NEXGEN SMOOTH PIN 3.2X75MM: Type: IMPLANTABLE DEVICE | Status: FUNCTIONAL

## 2023-10-16 DEVICE — BONE WAX 2.5GM: Type: IMPLANTABLE DEVICE | Status: FUNCTIONAL

## 2023-10-16 DEVICE — PIN FIX CAS 3.2X80MM STR: Type: IMPLANTABLE DEVICE | Status: FUNCTIONAL

## 2023-10-16 DEVICE — PATELLA VE 29MM: Type: IMPLANTABLE DEVICE | Status: FUNCTIONAL

## 2023-10-16 DEVICE — TIB PSN NP STM 5 DEG SZ DR: Type: IMPLANTABLE DEVICE | Status: FUNCTIONAL

## 2023-10-16 DEVICE — ZIMMER FEMALE HEX SCREW MAGNETIC 2.5MM X 25MM: Type: IMPLANTABLE DEVICE | Status: FUNCTIONAL

## 2023-10-16 RX ORDER — OXYCODONE HYDROCHLORIDE 5 MG/1
5 TABLET ORAL
Refills: 0 | Status: DISCONTINUED | OUTPATIENT
Start: 2023-10-16 | End: 2023-10-17

## 2023-10-16 RX ORDER — MAGNESIUM HYDROXIDE 400 MG/1
30 TABLET, CHEWABLE ORAL DAILY
Refills: 0 | Status: DISCONTINUED | OUTPATIENT
Start: 2023-10-16 | End: 2023-10-17

## 2023-10-16 RX ORDER — OXYCODONE HYDROCHLORIDE 5 MG/1
10 TABLET ORAL
Refills: 0 | Status: DISCONTINUED | OUTPATIENT
Start: 2023-10-16 | End: 2023-10-17

## 2023-10-16 RX ORDER — ONDANSETRON 8 MG/1
4 TABLET, FILM COATED ORAL ONCE
Refills: 0 | Status: DISCONTINUED | OUTPATIENT
Start: 2023-10-16 | End: 2023-10-16

## 2023-10-16 RX ORDER — TRANEXAMIC ACID 100 MG/ML
1000 INJECTION, SOLUTION INTRAVENOUS ONCE
Refills: 0 | Status: COMPLETED | OUTPATIENT
Start: 2023-10-16 | End: 2023-10-16

## 2023-10-16 RX ORDER — CEFAZOLIN SODIUM 1 G
2000 VIAL (EA) INJECTION ONCE
Refills: 0 | Status: COMPLETED | OUTPATIENT
Start: 2023-10-16 | End: 2023-10-16

## 2023-10-16 RX ORDER — CEFAZOLIN SODIUM 1 G
2000 VIAL (EA) INJECTION EVERY 8 HOURS
Refills: 0 | Status: COMPLETED | OUTPATIENT
Start: 2023-10-16 | End: 2023-10-17

## 2023-10-16 RX ORDER — HYDROMORPHONE HYDROCHLORIDE 2 MG/ML
1 INJECTION INTRAMUSCULAR; INTRAVENOUS; SUBCUTANEOUS
Refills: 0 | Status: DISCONTINUED | OUTPATIENT
Start: 2023-10-16 | End: 2023-10-16

## 2023-10-16 RX ORDER — HYDROMORPHONE HYDROCHLORIDE 2 MG/ML
0.5 INJECTION INTRAMUSCULAR; INTRAVENOUS; SUBCUTANEOUS
Refills: 0 | Status: DISCONTINUED | OUTPATIENT
Start: 2023-10-16 | End: 2023-10-16

## 2023-10-16 RX ORDER — APIXABAN 2.5 MG/1
2.5 TABLET, FILM COATED ORAL EVERY 12 HOURS
Refills: 0 | Status: DISCONTINUED | OUTPATIENT
Start: 2023-10-17 | End: 2023-10-17

## 2023-10-16 RX ORDER — ACETAMINOPHEN 500 MG
1000 TABLET ORAL ONCE
Refills: 0 | Status: COMPLETED | OUTPATIENT
Start: 2023-10-16 | End: 2023-10-16

## 2023-10-16 RX ORDER — ONDANSETRON 8 MG/1
4 TABLET, FILM COATED ORAL EVERY 6 HOURS
Refills: 0 | Status: DISCONTINUED | OUTPATIENT
Start: 2023-10-16 | End: 2023-10-17

## 2023-10-16 RX ORDER — POLYETHYLENE GLYCOL 3350 17 G/17G
17 POWDER, FOR SOLUTION ORAL AT BEDTIME
Refills: 0 | Status: DISCONTINUED | OUTPATIENT
Start: 2023-10-16 | End: 2023-10-17

## 2023-10-16 RX ORDER — OXYCODONE HYDROCHLORIDE 5 MG/1
5 TABLET ORAL ONCE
Refills: 0 | Status: DISCONTINUED | OUTPATIENT
Start: 2023-10-16 | End: 2023-10-16

## 2023-10-16 RX ORDER — APREPITANT 80 MG/1
40 CAPSULE ORAL ONCE
Refills: 0 | Status: COMPLETED | OUTPATIENT
Start: 2023-10-16 | End: 2023-10-16

## 2023-10-16 RX ORDER — PANTOPRAZOLE SODIUM 20 MG/1
40 TABLET, DELAYED RELEASE ORAL
Refills: 0 | Status: DISCONTINUED | OUTPATIENT
Start: 2023-10-16 | End: 2023-10-17

## 2023-10-16 RX ORDER — ACETAMINOPHEN 500 MG
1000 TABLET ORAL EVERY 8 HOURS
Refills: 0 | Status: DISCONTINUED | OUTPATIENT
Start: 2023-10-17 | End: 2023-10-17

## 2023-10-16 RX ORDER — HYDROMORPHONE HYDROCHLORIDE 2 MG/ML
0.5 INJECTION INTRAMUSCULAR; INTRAVENOUS; SUBCUTANEOUS
Refills: 0 | Status: DISCONTINUED | OUTPATIENT
Start: 2023-10-16 | End: 2023-10-17

## 2023-10-16 RX ORDER — SODIUM CHLORIDE 9 MG/ML
1000 INJECTION, SOLUTION INTRAVENOUS
Refills: 0 | Status: DISCONTINUED | OUTPATIENT
Start: 2023-10-16 | End: 2023-10-16

## 2023-10-16 RX ORDER — SODIUM CHLORIDE 9 MG/ML
500 INJECTION INTRAMUSCULAR; INTRAVENOUS; SUBCUTANEOUS ONCE
Refills: 0 | Status: COMPLETED | OUTPATIENT
Start: 2023-10-16 | End: 2023-10-16

## 2023-10-16 RX ORDER — SENNA PLUS 8.6 MG/1
2 TABLET ORAL AT BEDTIME
Refills: 0 | Status: DISCONTINUED | OUTPATIENT
Start: 2023-10-16 | End: 2023-10-17

## 2023-10-16 RX ORDER — DEXAMETHASONE 0.5 MG/5ML
8 ELIXIR ORAL ONCE
Refills: 0 | Status: COMPLETED | OUTPATIENT
Start: 2023-10-17 | End: 2023-10-17

## 2023-10-16 RX ORDER — SODIUM CHLORIDE 9 MG/ML
1000 INJECTION, SOLUTION INTRAVENOUS
Refills: 0 | Status: DISCONTINUED | OUTPATIENT
Start: 2023-10-16 | End: 2023-10-17

## 2023-10-16 RX ORDER — CELECOXIB 200 MG/1
200 CAPSULE ORAL EVERY 12 HOURS
Refills: 0 | Status: DISCONTINUED | OUTPATIENT
Start: 2023-10-16 | End: 2023-10-17

## 2023-10-16 RX ORDER — CHLORHEXIDINE GLUCONATE 213 G/1000ML
1 SOLUTION TOPICAL ONCE
Refills: 0 | Status: COMPLETED | OUTPATIENT
Start: 2023-10-16 | End: 2023-10-16

## 2023-10-16 RX ADMIN — SODIUM CHLORIDE 75 MILLILITER(S): 9 INJECTION, SOLUTION INTRAVENOUS at 17:40

## 2023-10-16 RX ADMIN — APREPITANT 40 MILLIGRAM(S): 80 CAPSULE ORAL at 11:25

## 2023-10-16 RX ADMIN — CHLORHEXIDINE GLUCONATE 1 APPLICATION(S): 213 SOLUTION TOPICAL at 11:25

## 2023-10-16 RX ADMIN — CELECOXIB 200 MILLIGRAM(S): 200 CAPSULE ORAL at 21:16

## 2023-10-16 RX ADMIN — Medication 400 MILLIGRAM(S): at 21:16

## 2023-10-16 RX ADMIN — SODIUM CHLORIDE 500 MILLILITER(S): 9 INJECTION INTRAMUSCULAR; INTRAVENOUS; SUBCUTANEOUS at 20:47

## 2023-10-16 RX ADMIN — SODIUM CHLORIDE 500 MILLILITER(S): 9 INJECTION INTRAMUSCULAR; INTRAVENOUS; SUBCUTANEOUS at 18:06

## 2023-10-16 RX ADMIN — CELECOXIB 200 MILLIGRAM(S): 200 CAPSULE ORAL at 21:19

## 2023-10-16 RX ADMIN — Medication 1000 MILLIGRAM(S): at 21:19

## 2023-10-16 NOTE — PHYSICAL THERAPY INITIAL EVALUATION ADULT - ADDITIONAL COMMENTS
Pt resides in pvt home with spouse, able to assist as needed upon d/c. Pt denies MARIAA/stairs. Pt has RW, commode and cane. Pt reports was independent prior to admission with use of cane.

## 2023-10-16 NOTE — PHYSICAL THERAPY INITIAL EVALUATION ADULT - IMPAIRED TRANSFERS: SIT/STAND, REHAB EVAL
B LE numbness/impaired proprioception/impaired motor control/decreased ROM/decreased sensation/decreased strength

## 2023-10-16 NOTE — DISCHARGE NOTE PROVIDER - CARE PROVIDER_API CALL
Winnie Nogueira  Orthopaedic Surgery  [address], [address]  [place] [address]  Phone: [phone]  Fax: [phone]  Follow Up Time:    Winnie Nogueira  Orthopaedic Surgery  [address], [address]  [place] [address]  Phone: [phone]  Fax: [phone]  Scheduled Appointment: 11/03/2023 09:45 AM

## 2023-10-16 NOTE — DISCHARGE NOTE PROVIDER - HOSPITAL COURSE
This patient was admitted to [facility] with a history of severe degenerative joint disease of the Left Knee.  Patient went to Pre-Surgical Testing at [facility] and was medically cleared to undergo elective procedure.  No operative or alice-operative complications arose during patients hospital course.  Patient received antibiotic according to SCIP guidelines for infection prevention.  Eliquis was given for DVT prophylaxis.  Anesthesia, Medical Hospitalist, Physical Therapy and Occupational Therapy were consulted. Patient is stable for discharge with a good prognosis.  Appropriate discharge instructions and medications are provided in this document. This patient was admitted to [facility] with a history of severe degenerative joint disease of the right knee.  Patient went to Pre-Surgical Testing at [facility] and was medically cleared to undergo elective right total knee replacement.  No operative or alice-operative complications arose during patients hospital course.  Patient received antibiotic according to SCIP guidelines for infection prevention.  Eliquis was given for DVT prophylaxis.  Anesthesia, Medical Hospitalist, Physical Therapy and Occupational Therapy were consulted. Patient is stable for discharge with a good prognosis.  Appropriate discharge instructions and medications are provided in this document. Patient going home with home care (skilled nursing, PT/OT).

## 2023-10-16 NOTE — DISCHARGE NOTE PROVIDER - NSDCCPCAREPLAN_GEN_ALL_CORE_FT
PRINCIPAL DISCHARGE DIAGNOSIS  Diagnosis: DJD (degenerative joint disease) of knee  Assessment and Plan of Treatment:      PRINCIPAL DISCHARGE DIAGNOSIS  Diagnosis: DJD (degenerative joint disease) of knee  Assessment and Plan of Treatment: right

## 2023-10-16 NOTE — DISCHARGE NOTE PROVIDER - INSTRUCTIONS
For Constipation :   • Increase your water intake. Drink at least 8 glasses of water daily.  • Try adding fiber to your diet by eating fruits, vegetables and foods that are rich in grains.  • If you do experience constipation, you may take an over-the-counter stool softener/laxative such as Lisa Colace, [place] or  Milk of Magnesia.

## 2023-10-16 NOTE — DISCHARGE NOTE PROVIDER - NSDCFUADDINST_GEN_ALL_CORE_FT
- Call your doctor if you experience:  • An increase in pain not controlled by pain medication or change in activity or  position.  • Temperature greater than 101° F.  • Redness, increased swelling or foul smelling drainage from or around the  incision.  • Numbness, tingling or a change in color or temperature of the operative extremity.  • Call your doctor immediately if you experience chest pain, shortness of breath or calf pain.     Follow all verbal and written instructions. Take medications as prescribed. DO NOT drive, operate machinery, and/or make important decisions while on prescription pain medication. DO NOT hesitate to call Doctor's office with questions or concerns.

## 2023-10-16 NOTE — DISCHARGE NOTE PROVIDER - PROVIDER TOKENS
PROVIDER:[TOKEN:[[id number]]] PROVIDER:[TOKEN:[[id number]],SCHEDULEDAPPT:[11/03/2023],SCHEDULEDAPPTTIME:[09:45 AM]]

## 2023-10-16 NOTE — DISCHARGE NOTE PROVIDER - NSDCCPTREATMENT_GEN_ALL_CORE_FT
PRINCIPAL PROCEDURE  Procedure: Right total knee replacement  Findings and Treatment: Your new total knee requires proper care.  Your care provider is your best resource for care information.  Please follow these basic guidlines.  Use pain medication if needed as prescribed.  Ice packs are Arthritis helpful addition to your comfort.  Applying Arthritis  waterproof ice pack over Arthritis cloth or thin towel to the site for 30 minutes per hour may provide benefit by reducing swelling and discomfort.  Your Physical Therapy/Occupational Therapy may include ambulation, transfers, stairs, ADL's (activities of daily living), range of motion exercises, and isometrics.  Your participation is vital to your recovery.  -Your Activity  • Weight Bearing as tolerated with rolling walker.  • Take short, frequent walks increasing the distance that you walk each day as tolerated.  • Change your position every hour to decrease pain and stiffness.  • Continue the exercises taught to you by your physical therapist.  • No driving until cleared by the doctor.  • No tub baths, hot tubs, or swimming pools until instructed by your doctor.  • Do not squat down on the floor.  • Do not kneel or twist your knee.  Keep incision clean and dry. Change the dressing daily if there is drainage noted from surgical wound. When there is no drainage, the wound may be left open to air.  Salves, ointments or other topical treatments are not to be used on the wound unless specifically recommended by your doctor.   If you have sutures (stiches) and no drainage form the incision you may shower allowing water to rinse the incision and pat it  dry afterward with A clean towel.  If you have Prineo (tape/glue) you may shower and pat the wound dry.  Prineo removal is done in the office 2 weeks after s urgery.  If you have further questions or problems call your doctors office.     PRINCIPAL PROCEDURE  Procedure: Right total knee replacement  Findings and Treatment: Physical Therapy/Occupational Therapy for: ambulation, transfers, stairs, ADL's (activities of daily living), and range of motion exercises  -Activity  • Weight Bearing as tolerated with rolling walker.  • Take short, frequent walks increasing the distance that you walk each day as tolerated.  • Change your position every hour to decrease pain and stiffness.  • Continue the exercises taught to you by your physical therapist.  • No driving until cleared by the doctor.  • No tub baths, hot tubs, or swimming pools until instructed by your doctor.  • Do not squat down on the floor.  • Do not kneel or twist your knee.  • Range of Motion Goals: Flexion= 120 degrees, Extension = 0 degrees  You have a Mepilex dressing. You may shower. Mepilex dressing is water resistant, not waterproof. Do not aim shower stream at surgical site.  Pat dry after showering.  Remove Mepilex dressing in 1 week.  Keep incision clean. DO NOT APPLY ANYTHING to incision site (salves/ointments/creams).  May shower.  Do not scrub incision site. Pat dry after shower.   Apply cryocuff to operative knee for 20 minutes at a time, several times a day, with at least a 1 hour break in between sessions.  Follow up with your primary care physician within 2-3 weeks of discharge home

## 2023-10-16 NOTE — DISCHARGE NOTE PROVIDER - NSDCMRMEDTOKEN_GEN_ALL_CORE_FT
Motrin  mg oral tablet: 2 tab(s) orally 2 times a day as needed for  severe pain   acetaminophen 500 mg oral tablet: 2 tab(s) orally every 8 hours  Aspirin Enteric Coated 81 mg oral delayed release tablet: 1 tab(s) orally every 12 hours after completion of Eliquis therapy. Take at least 2 hours before celebrex/meloxicam  CeleBREX 200 mg oral capsule: 1 cap(s) orally every 12 hours Take at lest 2 hours after aspirin  Eliquis 2.5 mg oral tablet: 1 tab(s) orally every 12 hours  omeprazole 20 mg oral delayed release capsule: 1 cap(s) orally once a day  oxyCODONE 5 mg oral tablet: 1 tab(s) orally every 4 hours as needed for  moderate pain May take 2 tabs for severe pain MDD: 6

## 2023-10-16 NOTE — DISCHARGE NOTE PROVIDER - NSDCFUADDAPPT_GEN_ALL_CORE_FT
It is advised to follow up with Primary Care Physician within 1 week from discharge.   Follow up with your primary care provider within the next 2 weeks to ensure your medications are appropriate and there are no underlying problems after your procedure.

## 2023-10-16 NOTE — PRE-OP CHECKLIST - CHLOROHEXIDINE WASH IN ASU
Last medication / physical 07/13/2020 and is to follow up in 1 year. Fasting labs ordered to be done a few days prior to a scheduled appointment. Refill sent to pharmacy.   
Routing to [facility].   
16-Oct-2023 11:36

## 2023-10-16 NOTE — DISCHARGE NOTE PROVIDER - NSDCACTIVITY_GEN_ALL_CORE
Follow Instructions Provided by your Surgical Team Do not drive or operate machinery/Showering allowed/Stairs allowed/Walking - Indoors allowed/Walking - Outdoors allowed/Follow Instructions Provided by your Surgical Team
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
## 2023-10-16 NOTE — DISCHARGE NOTE PROVIDER - NSDCFUSCHEDAPPT_GEN_ALL_CORE_FT
[facility]  ORTHOSURG [address]  Scheduled Appointment: 11/03/2023    Winnie Nogueira  [facility]  ORTHOSURG [address]  Scheduled Appointment: 12/22/2023

## 2023-10-17 ENCOUNTER — TRANSCRIPTION ENCOUNTER (OUTPATIENT)
Age: 68
End: 2023-10-17

## 2023-10-17 VITALS
TEMPERATURE: 98 F | RESPIRATION RATE: 17 BRPM | SYSTOLIC BLOOD PRESSURE: 132 MMHG | HEART RATE: 68 BPM | OXYGEN SATURATION: 97 % | DIASTOLIC BLOOD PRESSURE: 76 MMHG

## 2023-10-17 LAB
ANION GAP SERPL CALC-SCNC: 4 MMOL/L — LOW (ref 5–17)
ANION GAP SERPL CALC-SCNC: 4 MMOL/L — LOW (ref 5–17)
BUN SERPL-MCNC: 14 MG/DL — SIGNIFICANT CHANGE UP (ref 7–23)
BUN SERPL-MCNC: 14 MG/DL — SIGNIFICANT CHANGE UP (ref 7–23)
CALCIUM SERPL-MCNC: 9.1 MG/DL — SIGNIFICANT CHANGE UP (ref 8.4–10.5)
CALCIUM SERPL-MCNC: 9.1 MG/DL — SIGNIFICANT CHANGE UP (ref 8.4–10.5)
CHLORIDE SERPL-SCNC: 102 MMOL/L — SIGNIFICANT CHANGE UP (ref 96–108)
CHLORIDE SERPL-SCNC: 102 MMOL/L — SIGNIFICANT CHANGE UP (ref 96–108)
CO2 SERPL-SCNC: 29 MMOL/L — SIGNIFICANT CHANGE UP (ref 22–31)
CO2 SERPL-SCNC: 29 MMOL/L — SIGNIFICANT CHANGE UP (ref 22–31)
CREAT SERPL-MCNC: 0.55 MG/DL — SIGNIFICANT CHANGE UP (ref 0.5–1.3)
CREAT SERPL-MCNC: 0.55 MG/DL — SIGNIFICANT CHANGE UP (ref 0.5–1.3)
EGFR: 102 ML/MIN/1.73M2 — SIGNIFICANT CHANGE UP
EGFR: 102 ML/MIN/1.73M2 — SIGNIFICANT CHANGE UP
GLUCOSE SERPL-MCNC: 125 MG/DL — HIGH (ref 70–99)
GLUCOSE SERPL-MCNC: 125 MG/DL — HIGH (ref 70–99)
HCT VFR BLD CALC: 35.7 % — SIGNIFICANT CHANGE UP (ref 34.5–45)
HCT VFR BLD CALC: 35.7 % — SIGNIFICANT CHANGE UP (ref 34.5–45)
HGB BLD-MCNC: 11.4 G/DL — LOW (ref 11.5–15.5)
HGB BLD-MCNC: 11.4 G/DL — LOW (ref 11.5–15.5)
MCHC RBC-ENTMCNC: 28.1 PG — SIGNIFICANT CHANGE UP (ref 27–34)
MCHC RBC-ENTMCNC: 28.1 PG — SIGNIFICANT CHANGE UP (ref 27–34)
MCHC RBC-ENTMCNC: 31.9 GM/DL — LOW (ref 32–36)
MCHC RBC-ENTMCNC: 31.9 GM/DL — LOW (ref 32–36)
MCV RBC AUTO: 87.9 FL — SIGNIFICANT CHANGE UP (ref 80–100)
MCV RBC AUTO: 87.9 FL — SIGNIFICANT CHANGE UP (ref 80–100)
NRBC # BLD: 0 /100 WBCS — SIGNIFICANT CHANGE UP (ref 0–0)
NRBC # BLD: 0 /100 WBCS — SIGNIFICANT CHANGE UP (ref 0–0)
PLATELET # BLD AUTO: 266 K/UL — SIGNIFICANT CHANGE UP (ref 150–400)
PLATELET # BLD AUTO: 266 K/UL — SIGNIFICANT CHANGE UP (ref 150–400)
POTASSIUM SERPL-MCNC: 4 MMOL/L — SIGNIFICANT CHANGE UP (ref 3.5–5.3)
POTASSIUM SERPL-MCNC: 4 MMOL/L — SIGNIFICANT CHANGE UP (ref 3.5–5.3)
POTASSIUM SERPL-SCNC: 4 MMOL/L — SIGNIFICANT CHANGE UP (ref 3.5–5.3)
POTASSIUM SERPL-SCNC: 4 MMOL/L — SIGNIFICANT CHANGE UP (ref 3.5–5.3)
RBC # BLD: 4.06 M/UL — SIGNIFICANT CHANGE UP (ref 3.8–5.2)
RBC # BLD: 4.06 M/UL — SIGNIFICANT CHANGE UP (ref 3.8–5.2)
RBC # FLD: 12.7 % — SIGNIFICANT CHANGE UP (ref 10.3–14.5)
RBC # FLD: 12.7 % — SIGNIFICANT CHANGE UP (ref 10.3–14.5)
SODIUM SERPL-SCNC: 135 MMOL/L — SIGNIFICANT CHANGE UP (ref 135–145)
SODIUM SERPL-SCNC: 135 MMOL/L — SIGNIFICANT CHANGE UP (ref 135–145)
WBC # BLD: 13.06 K/UL — HIGH (ref 3.8–10.5)
WBC # BLD: 13.06 K/UL — HIGH (ref 3.8–10.5)
WBC # FLD AUTO: 13.06 K/UL — HIGH (ref 3.8–10.5)
WBC # FLD AUTO: 13.06 K/UL — HIGH (ref 3.8–10.5)

## 2023-10-17 PROCEDURE — 36415 COLL VENOUS BLD VENIPUNCTURE: CPT

## 2023-10-17 PROCEDURE — 85027 COMPLETE CBC AUTOMATED: CPT

## 2023-10-17 PROCEDURE — 80048 BASIC METABOLIC PNL TOTAL CA: CPT

## 2023-10-17 PROCEDURE — 97161 PT EVAL LOW COMPLEX 20 MIN: CPT

## 2023-10-17 PROCEDURE — 99232 SBSQ HOSP IP/OBS MODERATE 35: CPT

## 2023-10-17 PROCEDURE — 97165 OT EVAL LOW COMPLEX 30 MIN: CPT

## 2023-10-17 PROCEDURE — 97530 THERAPEUTIC ACTIVITIES: CPT

## 2023-10-17 PROCEDURE — C1713: CPT

## 2023-10-17 PROCEDURE — 73560 X-RAY EXAM OF KNEE 1 OR 2: CPT

## 2023-10-17 PROCEDURE — C1889: CPT

## 2023-10-17 PROCEDURE — C1776: CPT

## 2023-10-17 PROCEDURE — 97116 GAIT TRAINING THERAPY: CPT

## 2023-10-17 PROCEDURE — 27447 TOTAL KNEE ARTHROPLASTY: CPT

## 2023-10-17 RX ORDER — APIXABAN 2.5 MG/1
1 TABLET, FILM COATED ORAL
Qty: 27 | Refills: 0
Start: 2023-10-17 | End: 2023-10-30

## 2023-10-17 RX ORDER — CELECOXIB 200 MG/1
1 CAPSULE ORAL
Qty: 60 | Refills: 0
Start: 2023-10-17 | End: 2023-11-15

## 2023-10-17 RX ORDER — ASPIRIN/CALCIUM CARB/MAGNESIUM 324 MG
1 TABLET ORAL
Qty: 28 | Refills: 0
Start: 2023-10-17 | End: 2023-10-30

## 2023-10-17 RX ORDER — OMEPRAZOLE 10 MG/1
1 CAPSULE, DELAYED RELEASE ORAL
Qty: 30 | Refills: 0
Start: 2023-10-17 | End: 2023-11-15

## 2023-10-17 RX ORDER — OXYCODONE HYDROCHLORIDE 5 MG/1
1 TABLET ORAL
Qty: 42 | Refills: 0
Start: 2023-10-17 | End: 2023-10-23

## 2023-10-17 RX ORDER — IBUPROFEN 200 MG
2 TABLET ORAL
Refills: 0 | DISCHARGE

## 2023-10-17 RX ORDER — ACETAMINOPHEN 500 MG
2 TABLET ORAL
Qty: 0 | Refills: 0 | DISCHARGE
Start: 2023-10-17

## 2023-10-17 RX ADMIN — CELECOXIB 200 MILLIGRAM(S): 200 CAPSULE ORAL at 08:42

## 2023-10-17 RX ADMIN — Medication 1000 MILLIGRAM(S): at 06:35

## 2023-10-17 RX ADMIN — CELECOXIB 200 MILLIGRAM(S): 200 CAPSULE ORAL at 08:35

## 2023-10-17 RX ADMIN — Medication 100 MILLIGRAM(S): at 08:35

## 2023-10-17 RX ADMIN — Medication 100 MILLIGRAM(S): at 01:27

## 2023-10-17 RX ADMIN — Medication 101.6 MILLIGRAM(S): at 05:43

## 2023-10-17 RX ADMIN — PANTOPRAZOLE SODIUM 40 MILLIGRAM(S): 20 TABLET, DELAYED RELEASE ORAL at 05:44

## 2023-10-17 RX ADMIN — Medication 1000 MILLIGRAM(S): at 13:07

## 2023-10-17 RX ADMIN — Medication 1000 MILLIGRAM(S): at 05:43

## 2023-10-17 RX ADMIN — APIXABAN 2.5 MILLIGRAM(S): 2.5 TABLET, FILM COATED ORAL at 08:35

## 2023-10-17 RX ADMIN — Medication 1000 MILLIGRAM(S): at 13:08

## 2023-10-17 RX ADMIN — SODIUM CHLORIDE 100 MILLILITER(S): 9 INJECTION, SOLUTION INTRAVENOUS at 05:44

## 2023-10-17 NOTE — CARE COORDINATION ASSESSMENT. - NSCAREPROVIDERS_GEN_ALL_CORE_FT
CARE PROVIDERS:  Administration: Leonora Glaser  Administration: Lisset Mendes  Administration: Stanton Peter  Admitting: Winnie Nogueira  Attending: Winnie Nogueira  Consultant: Weil, Patricia  Consultant: Padmini Munguia  Infection Control: Lesli Rashid  Nurse: Erica Richardson  Nurse: Bhakti Schwartz  Override: Rossy Swenson  Override: Kieran Del Rio  PCA/Nursing Assistant: Alysha Keyes  Physical Therapy: Marni Francis  Physical Therapy: Javy Eid  Primary Team: Slava Hankins  Primary Team: Musa Pantoja  Primary Team: Db Awan  Primary Team: Sue Ballesteros  Primary Team: Gerry Mckeon  Primary Team: Crissy Acevedo  Respiratory Therapy: Kelechi Reina  Team: LESIA SANTO Hospitalists, Team  UR// Supp. Assoc.: Whitney Cornell

## 2023-10-17 NOTE — DISCHARGE NOTE NURSING/CASE MANAGEMENT/SOCIAL WORK - NSSCNAMETXT_GEN_ALL_CORE
[facility] care agency [phone] will reach out to you within 24-72 hours of your discharge to schedule home care visit/eval appointment with you. Please call agency for any queries regarding home care services

## 2023-10-17 NOTE — CARE COORDINATION ASSESSMENT. - NSDCPLANSERVICES_GEN_ALL_CORE
CM met with patient at bedside.  Patient. A&O x 4. Pt s/p  PROCEDURES: Total right  knee replacement.   Pt  resting comfortably / Pt has no complaints at this time.  CM explained role of CM and availability to assist with discharge planning throughout stay.   Provided CM contact information and pt. verbalized understanding. Patient lives in a private house with her , no stairs to navigate. Prior to surgery patient was ind with occasional cane use. Patient identified her  as her caregiver at home who will assist her during her recuperation and will transport her home and to MD appointments.   Pt. is agreeable with PT/OT's recommendations for d/c plan to home with HC/PT.  CM explained home care expectations, process, insurance provisions and home safety with good understanding.   Offered list of [facility] and pt. chose [facility] at home care agency.  Provided discharge resources folder. CM made referral accordingly.  Informed them of Anticipated  DC date for today 10/17/2023 and for SOC tomorrow 10/18/2023.  Patient provided with info on the transitional care management/health solutions team who will be following her upon DC.  Pt verbalizing understanding and in agreement with d/c plans.   DME: Pt has a RW, commode at home.  PCP: Dr Johann Pedersen [phone]  Pt stated she will be receiving meds to bed from [facility] pharmacy prior to DC./Home Care

## 2023-10-17 NOTE — PROGRESS NOTE ADULT - SUBJECTIVE AND OBJECTIVE BOX
medicine consult note     Patient is a 67y old  Female who presents with a chief complaint of Right Total Knee Replacement (16 Oct 2023 14:56)      Patient seen and examined at bedside. No overnight events reported.  comfortable. in no acute distress. no fever     ALLERGIES:  No Known Drug Allergies  latex (Rash)    MEDICATIONS  (STANDING):  acetaminophen     Tablet .. 1000 milliGRAM(s) Oral every 8 hours  apixaban 2.5 milliGRAM(s) Oral every 12 hours  celecoxib 200 milliGRAM(s) Oral every 12 hours  lactated ringers. 1000 milliLiter(s) (100 mL/Hr) IV Continuous <Continuous>  pantoprazole    Tablet 40 milliGRAM(s) Oral before breakfast  polyethylene glycol 3350 17 Gram(s) Oral at bedtime  senna 2 Tablet(s) Oral at bedtime    MEDICATIONS  (PRN):  HYDROmorphone  Injectable 0.5 milliGRAM(s) IV Push every 3 hours PRN Breakthrough pain  magnesium hydroxide Suspension 30 milliLiter(s) Oral daily PRN Constipation  ondansetron Injectable 4 milliGRAM(s) IV Push every 6 hours PRN Nausea and/or Vomiting  oxyCODONE    IR 5 milliGRAM(s) Oral every 3 hours PRN Moderate Pain (4 - 6)  oxyCODONE    IR 10 milliGRAM(s) Oral every 3 hours PRN Severe Pain (7 - 10)    Vital Signs Last 24 Hrs  T(F): 97.7 (17 Oct 2023 07:49), Max: 98.4 (16 Oct 2023 23:00)  HR: 68 (17 Oct 2023 07:49) (63 - 80)  BP: 132/76 (17 Oct 2023 07:49) (113/74 - 137/75)  RR: 17 (17 Oct 2023 07:49) (10 - 18)  SpO2: 97% (17 Oct 2023 07:49) (94% - 100%)  I&O's Summary    16 Oct 2023 07:01  -  17 Oct 2023 07:00  --------------------------------------------------------  IN: 2340 mL / OUT: 255 mL / NET: 2085 mL      PHYSICAL EXAM:  General: NAD, A/O x 3  ENT: MMM, no thrush  Neck: Supple, No JVD  Lungs: Clear to auscultation bilaterally, good air entry, non-labored breathing  Cardio: RRR, S1/S2, No murmur  Abdomen: Soft, Nontender, Nondistended; Bowel sounds present  Extremities: rt knee post op changes ,     LABS:                        11.4   13.06 )-----------( 266      ( 17 Oct 2023 06:00 )             35.7     10-17    135  |  102  |  14  ----------------------------<  125  4.0   |  29  |  0.55    Ca    9.1      17 Oct 2023 06:00      Urinalysis Basic - ( 17 Oct 2023 06:00 )    Color: x / Appearance: x / SG: x / pH: x  Gluc: 125 mg/dL / Ketone: x  / Bili: x / Urobili: x   Blood: x / Protein: x / Nitrite: x   Leuk Esterase: x / RBC: x / WBC x   Sq Epi: x / Non Sq Epi: x / Bacteria: x          RADIOLOGY & ADDITIONAL TESTS:    Care Discussed with Consultants/Other Providers:       PAST MEDICAL & SURGICAL HISTORY:  COVID-19 vaccine series completed      Osteoarthritis      Primary osteoarthritis of right knee      Latex allergy      History of migraine      Environmental allergies      History of gastroesophageal reflux (GERD)      History of kidney stones      Microscopic hematuria      Benign colon polyp      History of varicose veins      History of arthroscopy of right knee      History of arthroscopy of left knee      History of lithotripsy      History of lumpectomy of right breast      History of right breast biopsy      History of left breast biopsy      History of varicose vein ligation and stripping      History of right cataract extraction      History of left cataract extraction      History of parotidectomy      social  hx: lives independently   
Discharge medication calendar:  Eliquis 2.5mg q12h x 2 weeks then ASA EC 81mg q12h x 2 weeks  Omeprazole 20mg QAM x 4 weeks  Celecoxib 200mg q12h x 2-3 weeks  APAP 1000mg q8h x 2-3 weeks  Narcotic PRN  Docusate 100mg TID while taking narcotic  Miralax, Senna, or Bisacodyl PRN for treatment of constipation  
Post Op Day #1    SUBJECTIVE    68yo Female status post right TKR .   Patient is alert and comfortable.    Pain is controlled with current pain regimen.  Denies nausea, vomiting, chest pain, shortness of breath, abdominal pain or fever.   No new complaints.    OBJECTIVE    Vital Signs Last 24 Hrs  T(C): 36.5 (17 Oct 2023 07:49), Max: 36.9 (16 Oct 2023 23:00)  T(F): 97.7 (17 Oct 2023 07:49), Max: 98.4 (16 Oct 2023 23:00)  HR: 68 (17 Oct 2023 07:49) (63 - 80)  BP: 132/76 (17 Oct 2023 07:49) (113/74 - 137/75)  BP(mean): --  RR: 17 (17 Oct 2023 07:49) (10 - 18)  SpO2: 97% (17 Oct 2023 07:49) (94% - 100%)    Parameters below as of 17 Oct 2023 07:49  Patient On (Oxygen Delivery Method): room air      I&O's Summary    16 Oct 2023 07:01  -  17 Oct 2023 07:00  --------------------------------------------------------  IN: 2340 mL / OUT: 255 mL / NET: 2085 mL        PHYSICAL EXAM    Right knee Mepilex dressing is clean, dry and intact.   The calf is supple/nontender.   Sensation to light touch is grossly intact distally.   Motor function distally is intact.   No foot drop.   (2+) dorsalis pedis pulse. Capillary refill is less than 2 seconds. No cyanosis.                          11.4<L>  13.06<H> )-----------( 266      ( 17 Oct 2023 06:00 )             35.7   17 Oct 2023 06:00    17 Oct 2023 06:00    135    |  102    |  14     ----------------------------<  125<H>  4.0     |  29     |  0.55     Ca    9.1        17 Oct 2023 06:00        ASSESSMENT AND PLAN  - Orthopedically stable  - DVT prophylaxis: PAS + Eliquis 2.5mg every 12 hours  - Continue physical therapy and occupational therapy  - Weight bearing as tolerated of the right lower extremity with assistance of a walker  - Incentive spirometry encouraged  - Pain control as clinically indicated  - Disposition:  Home when cleared by medicine and PT/OT

## 2023-10-17 NOTE — DISCHARGE NOTE NURSING/CASE MANAGEMENT/SOCIAL WORK - NSDCFUADDAPPT_GEN_ALL_CORE_FT
Overall seems to be doing well  The skin behind the left knee seems to have healed and does not need to use bandage any more  Will give flu shot today    Continue current meds as is It is advised to follow up with Primary Care Physician within 1 week from discharge.

## 2023-10-17 NOTE — CARE COORDINATION ASSESSMENT. - NSPASTMEDSURGHISTORY_GEN_ALL_CORE_FT
PAST MEDICAL & SURGICAL HISTORY:  History of varicose veins      Benign colon polyp      Microscopic hematuria      History of kidney stones      History of gastroesophageal reflux (GERD)      Environmental allergies      History of migraine      Latex allergy      Primary osteoarthritis of right knee      Osteoarthritis      COVID-19 vaccine series completed      History of parotidectomy      History of left cataract extraction      History of right cataract extraction      History of varicose vein ligation and stripping      History of left breast biopsy      History of right breast biopsy      History of lumpectomy of right breast      History of lithotripsy      History of arthroscopy of left knee      History of arthroscopy of right knee

## 2023-10-17 NOTE — PHARMACOTHERAPY INTERVENTION NOTE - COMMENTS
Meds to Bed (M2B) received from [facility] pharmacy were delivered to patient at bed side.  Pharmacy Staff did a final review/inquiry with the patient to ensure understanding and use of medication that was provided. Patient questions or concerns about their discharge drug therapy were addressed for their transition home.  All issues were addressed and well wishes provided.  
Admission medication reconciliation POD1  
Transition of Care video discharge education - medication calendar given to patient

## 2023-10-17 NOTE — PROGRESS NOTE ADULT - ASSESSMENT
67 f with hx of severe degenerative joint disease of the right knee.  Ps/p  elective right total knee replacement.  No operative or alice-operative complications . Eliquis was given for DVT prophylaxis  patient is doign well. no post op issues.  ambulated with pt     > severe arthritis / s/p TKR rt   - post op care as per ortho   - dvt ppx as per ortho team , eliquis bid   - pt/ot as per pt   - do not use celebrex while on aspirin/ eliquis   - tylenol prn for pain       patient is clared for dc from medical stand point f/f done  67 f with hx of severe degenerative joint disease of the right knee.  Ps/p  elective right total knee replacement.  No operative or alice-operative complications . Eliquis was given for DVT prophylaxis  patient is doign well. no post op issues.  ambulated with pt     > severe arthritis / s/p TKR rt   - post op care as per ortho   - dvt ppx as per ortho team , eliquis bid   - pt/ot as per pt    - tylenol prn for pain       patient is clared for dc from medical stand point f/f done

## 2023-10-17 NOTE — DISCHARGE NOTE NURSING/CASE MANAGEMENT/SOCIAL WORK - NSDCPEFALRISK_GEN_ALL_CORE
For information on Fall & Injury Prevention, visit: [email or web address] OR  [email or web address] OR  https://www.cdc.gov/steadi/patient.html

## 2023-10-17 NOTE — DISCHARGE NOTE NURSING/CASE MANAGEMENT/SOCIAL WORK - PATIENT PORTAL LINK FT
You can access the FollowMyHealth Patient Portal offered by [facility] by registering at the following website: [email or web address]. By joining [employer]’s FollowMyHealth portal, you will also be able to view your health information using other applications (apps) compatible with our system.

## 2023-10-18 ENCOUNTER — TRANSCRIPTION ENCOUNTER (OUTPATIENT)
Age: 68
End: 2023-10-18

## 2023-10-20 ENCOUNTER — TRANSCRIPTION ENCOUNTER (OUTPATIENT)
Age: 68
End: 2023-10-20

## 2023-10-23 DIAGNOSIS — Z96.651 PRESENCE OF RIGHT ARTIFICIAL KNEE JOINT: ICD-10-CM

## 2023-10-28 ENCOUNTER — NON-APPOINTMENT (OUTPATIENT)
Age: 68
End: 2023-10-28

## 2023-10-28 ENCOUNTER — TRANSCRIPTION ENCOUNTER (OUTPATIENT)
Age: 68
End: 2023-10-28

## 2023-10-28 RX ORDER — OXYCODONE 5 MG/1
5 TABLET ORAL
Qty: 60 | Refills: 0 | Status: ACTIVE | COMMUNITY
Start: 2023-10-28 | End: 1900-01-01

## 2023-10-30 ENCOUNTER — TRANSCRIPTION ENCOUNTER (OUTPATIENT)
Age: 68
End: 2023-10-30

## 2023-11-03 ENCOUNTER — APPOINTMENT (OUTPATIENT)
Dept: ORTHOPEDIC SURGERY | Facility: CLINIC | Age: 68
End: 2023-11-03
Payer: MEDICARE

## 2023-11-03 VITALS — SYSTOLIC BLOOD PRESSURE: 136 MMHG | DIASTOLIC BLOOD PRESSURE: 89 MMHG | HEART RATE: 78 BPM

## 2023-11-03 PROCEDURE — 73562 X-RAY EXAM OF KNEE 3: CPT | Mod: 26,RT

## 2023-11-03 PROCEDURE — 99024 POSTOP FOLLOW-UP VISIT: CPT

## 2023-11-08 ENCOUNTER — TRANSCRIPTION ENCOUNTER (OUTPATIENT)
Age: 68
End: 2023-11-08

## 2023-11-27 RX ORDER — GABAPENTIN 100 MG/1
100 CAPSULE ORAL
Qty: 21 | Refills: 0 | Status: ACTIVE | COMMUNITY
Start: 2023-11-03 | End: 1900-01-01

## 2023-11-30 ENCOUNTER — TRANSCRIPTION ENCOUNTER (OUTPATIENT)
Age: 68
End: 2023-11-30

## 2023-12-22 ENCOUNTER — APPOINTMENT (OUTPATIENT)
Dept: ORTHOPEDIC SURGERY | Facility: CLINIC | Age: 68
End: 2023-12-22
Payer: MEDICARE

## 2023-12-22 VITALS — SYSTOLIC BLOOD PRESSURE: 144 MMHG | HEART RATE: 73 BPM | DIASTOLIC BLOOD PRESSURE: 85 MMHG

## 2023-12-22 PROCEDURE — 99024 POSTOP FOLLOW-UP VISIT: CPT

## 2023-12-22 PROCEDURE — 73562 X-RAY EXAM OF KNEE 3: CPT | Mod: RT

## 2023-12-22 NOTE — HISTORY OF PRESENT ILLNESS
[___ Months Post Op] : [unfilled] months post op [8] : the patient reports pain that is 8/10 in severity [Clean/Dry/Intact] : clean, dry and intact [Healed] : healed [Erythema] : not erythematous [Discharge] : absent of discharge [Swelling] : not swollen [Dehiscence] : not dehisced [Neuro Intact] : an unremarkable neurological exam [Vascular Intact] : ~T peripheral vascular exam normal [Negative Jam's] : maneuvers demonstrated a negative Jam's sign [de-identified] : Right TKR 10/16/23 [de-identified] : Patient is a 68-year-old female who presents today for an evaluation regarding her right knee.  She is status post right total knee replacement on October 16, 2023.  Overall she states she is been doing fairly well.  But does have a burning sensation on the right side.  She also states that she does get stiff which will which could explain why she is having significant lower back pain as well.  She states that due to the back pain her therapist sent her to a back specialist who performed x-rays and MRIs and did not find any reasoning.  She states that she has been receiving physical therapy twice a week.  The pain is currently an 8 on a scale of 1-10. [de-identified] : On physical examination of the right knee.  Patient is status post right total knee replacement.  There is a well-healed surgical scar with no evidence of infection superficial or deep.  There is no redness, swelling, heat, discharge or ecchymosis noted.  There is no pain or tenderness on examination.  The patient has good range of motion both passively and actively. As they are able to fully extend the knee with good flexion.  Patient also has good strength with range of motion against resistance.  There is no evidence of ligamentous laxity.  As this was tested in anterior posterior drawer test as well as varus and valgus stress testing.  There is no evidence of muscle atrophy or palpable defect.  No evidence of any motor or sensory deficit.  Patient has good distal pulses with no calf tenderness. The pt is unable to fully extend the knee. There is a lack of extension of approximately 10 degrees. The pt is unable to fully flex the knee. Flexion is limited to approximately 100 degrees.   [de-identified] : X-rays of the right knee reveals a status post total knee replacement. In all three views; the AP, lateral and sunrise views. The hardware is in place and shows excellent alignment as well as fixation. There is no evidence of any fracture, dislocation or loosening of any hardware. [de-identified] : Status post right total knee replacement on October 16, 2023 [de-identified] : After thorough history full examination and review of x-rays.  It was explained to the patient that she is doing very well following her knee replacement.  However she does present with some stiffness in her knee with flexion but more so with extension.  She was explained that a manipulation under anesthesia is warranted.  She was explained this in great detail as well as the risk benefits pros and cons of the manipulation.  She was explained about the postop the possible side effects or complications which include but not limited to tendon rupture or fracture.  She was explained this all in great detail and she did agree to undergo this procedure.  Therefore she will speak to the surgical coordinator to arrange a date of the manipulation under anesthesia and to discuss this with her primary care physician for clearance.  I, Dr. Nogueira, personally performed the evaluation and management services for this established patient who presents today with an orthopedic condition. The evaluation and management includes conducting the conditions and establishing a plan of care.  Today, [facility], was here to assist with the patient in my evaluation and management services for this condition which will be followed going forward.   35 minutes were spent, face to face, in direct consultation with the patient. This includes reviewing the natural history of their Dx., eliciting the history, performing an orthopedic exam, review of the x-ray findings, forming a differential Dx and discussing all treatment options. This Includes both surgical and non-surgical treatments. I also reviewed all the risks and benefits of non-operative & operative Tx options, future impact into orthopedic functions/problems, activity restrictions both at home and at work, and all follow up requirements.

## 2023-12-27 ENCOUNTER — TRANSCRIPTION ENCOUNTER (OUTPATIENT)
Age: 68
End: 2023-12-27

## 2024-01-03 ENCOUNTER — APPOINTMENT (OUTPATIENT)
Dept: ORTHOPEDIC SURGERY | Facility: HOSPITAL | Age: 69
End: 2024-01-03

## 2024-01-11 ENCOUNTER — TRANSCRIPTION ENCOUNTER (OUTPATIENT)
Age: 69
End: 2024-01-11

## 2024-01-25 RX ORDER — AMOXICILLIN 500 MG/1
500 CAPSULE ORAL
Qty: 12 | Refills: 1 | Status: ACTIVE | COMMUNITY
Start: 2024-01-25 | End: 1900-01-01

## 2024-02-01 ENCOUNTER — TRANSCRIPTION ENCOUNTER (OUTPATIENT)
Age: 69
End: 2024-02-01

## 2025-02-07 ENCOUNTER — APPOINTMENT (OUTPATIENT)
Dept: ORTHOPEDIC SURGERY | Facility: CLINIC | Age: 70
End: 2025-02-07
Payer: COMMERCIAL

## 2025-02-07 DIAGNOSIS — Z96.651 PRESENCE OF RIGHT ARTIFICIAL KNEE JOINT: ICD-10-CM

## 2025-02-07 PROCEDURE — 99203 OFFICE O/P NEW LOW 30 MIN: CPT

## 2025-02-07 PROCEDURE — 73562 X-RAY EXAM OF KNEE 3: CPT | Mod: RT

## 2025-02-25 ENCOUNTER — APPOINTMENT (OUTPATIENT)
Dept: ORTHOPEDIC SURGERY | Facility: CLINIC | Age: 70
End: 2025-02-25
Payer: COMMERCIAL

## 2025-02-25 DIAGNOSIS — M17.11 UNILATERAL PRIMARY OSTEOARTHRITIS, RIGHT KNEE: ICD-10-CM

## 2025-02-25 DIAGNOSIS — M17.12 UNILATERAL PRIMARY OSTEOARTHRITIS, LEFT KNEE: ICD-10-CM

## 2025-02-25 DIAGNOSIS — S80.01XA CONTUSION OF RIGHT KNEE, INITIAL ENCOUNTER: ICD-10-CM

## 2025-02-25 DIAGNOSIS — Z96.651 PRESENCE OF RIGHT ARTIFICIAL KNEE JOINT: ICD-10-CM

## 2025-02-25 PROCEDURE — 99204 OFFICE O/P NEW MOD 45 MIN: CPT

## 2025-02-25 PROCEDURE — 73564 X-RAY EXAM KNEE 4 OR MORE: CPT | Mod: LT

## 2025-03-28 ENCOUNTER — APPOINTMENT (OUTPATIENT)
Dept: ORTHOPEDIC SURGERY | Facility: CLINIC | Age: 70
End: 2025-03-28

## (undated) DEVICE — SOL IRR POUR H2O 1500ML

## (undated) DEVICE — DRSG COMBINE 5X9"

## (undated) DEVICE — DRSG CURITY GAUZE SPONGE 4 X 4" 12-PLY

## (undated) DEVICE — SOL IRR POUR NS 0.9% 500ML

## (undated) DEVICE — DRSG DERMABOND PRINEO 60CM

## (undated) DEVICE — WARMING BLANKET FULL ADULT

## (undated) DEVICE — CRYO/CUFF GRAVITY COOLER KNEE LARGE

## (undated) DEVICE — SOLIDIFIER CANN EXPRESS 3K

## (undated) DEVICE — SUT MONOCRYL 3-0 18" PS-1

## (undated) DEVICE — PACK TOTAL KNEE

## (undated) DEVICE — SYR LUER LOK 50CC

## (undated) DEVICE — NDL SPINAL 18G X 3.5" (PINK)

## (undated) DEVICE — VENODYNE/SCD SLEEVE CALF MEDIUM

## (undated) DEVICE — WARMING BLANKET UPPER ADULT

## (undated) DEVICE — SUT MONOSOF 3-0 30" C-16

## (undated) DEVICE — ORTHOALIGN PLUS UNIT

## (undated) DEVICE — SUT VICRYL PLUS 2-0 27" CP-1 UNDYED

## (undated) DEVICE — WOUND IRR IRRISEPT W 0.5 CHG

## (undated) DEVICE — HOOD FLYTE STRYKER HELMET SHIELD

## (undated) DEVICE — SOL IRR BAG NS 0.9% 3000ML

## (undated) DEVICE — SUT VICRYL PLUS 1 27" CP UNDYED

## (undated) DEVICE — SPECIMEN CONTAINER PET

## (undated) DEVICE — TOURNIQUET CUFF 34" DUAL PORT W PLC

## (undated) DEVICE — HANDPIECE INTERPULSE W MULTI TIP

## (undated) DEVICE — ROSA NAVITRACKER KIT KNEE/SPINE

## (undated) DEVICE — ELCTR ROCKER SWITCH PENCIL BLUE 10FT